# Patient Record
Sex: MALE | Race: WHITE | NOT HISPANIC OR LATINO | Employment: FULL TIME | URBAN - NONMETROPOLITAN AREA
[De-identification: names, ages, dates, MRNs, and addresses within clinical notes are randomized per-mention and may not be internally consistent; named-entity substitution may affect disease eponyms.]

---

## 2021-10-22 ENCOUNTER — APPOINTMENT (OUTPATIENT)
Dept: GENERAL RADIOLOGY | Facility: HOSPITAL | Age: 21
End: 2021-10-22

## 2021-10-22 ENCOUNTER — HOSPITAL ENCOUNTER (EMERGENCY)
Facility: HOSPITAL | Age: 21
Discharge: HOME | End: 2021-10-22
Attending: STUDENT IN AN ORGANIZED HEALTH CARE EDUCATION/TRAINING PROGRAM | Admitting: EMERGENCY MEDICINE

## 2021-10-22 VITALS
BODY MASS INDEX: 27.09 KG/M2 | HEART RATE: 79 BPM | SYSTOLIC BLOOD PRESSURE: 126 MMHG | DIASTOLIC BLOOD PRESSURE: 83 MMHG | WEIGHT: 200 LBS | HEIGHT: 72 IN | TEMPERATURE: 97.7 F | OXYGEN SATURATION: 99 % | RESPIRATION RATE: 18 BRPM

## 2021-10-22 DIAGNOSIS — S86.911A KNEE STRAIN, RIGHT, INITIAL ENCOUNTER: Primary | ICD-10-CM

## 2021-10-22 PROCEDURE — 73562 X-RAY EXAM OF KNEE 3: CPT

## 2021-10-22 PROCEDURE — 73562 X-RAY EXAM OF KNEE 3: CPT | Performed by: RADIOLOGY

## 2021-10-22 PROCEDURE — 99283 EMERGENCY DEPT VISIT LOW MDM: CPT

## 2021-10-22 RX ORDER — NAPROXEN 500 MG/1
500 TABLET ORAL 2 TIMES DAILY PRN
Qty: 12 TABLET | Refills: 0 | Status: SHIPPED | OUTPATIENT
Start: 2021-10-22 | End: 2022-11-23

## 2021-10-22 NOTE — ED PROVIDER NOTES
Subjective     History provided by:  Patient   used: No    Knee Pain  Location:  Knee  Time since incident:  1 day  Injury: no    Knee location:  R knee  Pain details:     Quality:  Aching    Radiates to:  Does not radiate    Severity:  Mild    Onset quality:  Sudden    Duration:  1 day    Timing:  Constant    Progression:  Worsening  Chronicity:  New  Dislocation: no    Relieved by:  Nothing  Worsened by:  Bearing weight  Ineffective treatments:  None tried  Associated symptoms: no fever        Review of Systems   Constitutional: Negative for chills and fever.   HENT: Negative for congestion, ear pain and sore throat.    Respiratory: Negative for cough, shortness of breath and wheezing.    Cardiovascular: Negative for chest pain.   Gastrointestinal: Negative for diarrhea, nausea and vomiting.   Genitourinary: Negative for dysuria and flank pain.   Musculoskeletal: Positive for joint swelling.   Skin: Negative for rash.   Neurological: Negative for headaches.   Psychiatric/Behavioral: The patient is not nervous/anxious.    All other systems reviewed and are negative.      No past medical history on file.    Allergies   Allergen Reactions   • Amoxicillin Rash       No past surgical history on file.    No family history on file.    Social History     Socioeconomic History   • Marital status: Single           Objective   Physical Exam  Vitals and nursing note reviewed.   Constitutional:       Appearance: He is well-developed.   HENT:      Head: Normocephalic.   Cardiovascular:      Rate and Rhythm: Normal rate and regular rhythm.   Pulmonary:      Effort: Pulmonary effort is normal.      Breath sounds: Normal breath sounds.   Abdominal:      General: Bowel sounds are normal.      Palpations: Abdomen is soft.      Tenderness: There is no abdominal tenderness.   Musculoskeletal:      Cervical back: Neck supple.      Right knee: Swelling present. Decreased range of motion.   Skin:     General: Skin is  warm and dry.   Neurological:      Mental Status: He is alert and oriented to person, place, and time.   Psychiatric:         Behavior: Behavior normal.         Thought Content: Thought content normal.         Judgment: Judgment normal.         Procedures           ED Course                                           MDM    Final diagnoses:   Knee strain, right, initial encounter       ED Disposition  ED Disposition     ED Disposition Condition Comment    Discharge Stable           Josue Godoy MD  [address] [place] [address] [phone]    In 2 days           Medication List      New Prescriptions    naproxen 500 MG EC tablet  Commonly known as: EC NAPROSYN  Take 1 tablet by mouth 2 (Two) Times a Day As Needed for Mild Pain .           Where to Get Your Medications      These medications were sent to [facility] [address] [place] [address] - [address] [place] [address] - [phone]  - [phone] [address] [address]    Phone: [phone]   · naproxen 500 MG EC tablet          Elodia Lisa PA  10/26/21 1016

## 2021-10-22 NOTE — ED NOTES
Pt being discharged ambulatory to car on crutches with knee immobilizer in place. Neurovascular status remains intact; NAD noted; respirations even and unlabored; alert and oriented X4; discharge papers and prescriptions given; patient verbalized understanding of follow up and discharge instructions; advised to return to [facility] with any issues         Dileep Alvarado, RN  10/22/21 0459

## 2021-10-22 NOTE — ED NOTES
Pt sitting up in chair. Results pending. updated on wait times; verbalized understanding. Skin pwd. denies any needs at this time. will continue to monitor. advised to notify RN of any change in condition.       Dileep Alvarado, RN  10/22/21 4486

## 2022-02-10 ENCOUNTER — OFFICE VISIT (OUTPATIENT)
Dept: PSYCHIATRY | Facility: CLINIC | Age: 22
End: 2022-02-10

## 2022-02-10 VITALS
WEIGHT: 230.8 LBS | HEIGHT: 72 IN | HEART RATE: 79 BPM | DIASTOLIC BLOOD PRESSURE: 86 MMHG | SYSTOLIC BLOOD PRESSURE: 126 MMHG | BODY MASS INDEX: 31.26 KG/M2

## 2022-02-10 DIAGNOSIS — R44.3 HALLUCINATIONS: ICD-10-CM

## 2022-02-10 DIAGNOSIS — F22: Primary | ICD-10-CM

## 2022-02-10 DIAGNOSIS — F84.0 HISTORY OF AUTISM SPECTRUM DISORDER: ICD-10-CM

## 2022-02-10 DIAGNOSIS — Z79.899 MEDICATION MANAGEMENT: ICD-10-CM

## 2022-02-10 LAB
AMPHETAMINE CUT-OFF: NORMAL
BENZODIAZIPINE CUT-OFF: NORMAL
BUPRENORPHINE CUT-OFF: NORMAL
COCAINE CUT-OFF: NORMAL
EXTERNAL AMPHETAMINE SCREEN URINE: NEGATIVE
EXTERNAL BENZODIAZEPINE SCREEN URINE: NEGATIVE
EXTERNAL BUPRENORPHINE SCREEN URINE: NEGATIVE
EXTERNAL COCAINE SCREEN URINE: NEGATIVE
EXTERNAL MDMA: NEGATIVE
EXTERNAL METHADONE SCREEN URINE: NEGATIVE
EXTERNAL METHAMPHETAMINE SCREEN URINE: NEGATIVE
EXTERNAL OPIATES SCREEN URINE: NEGATIVE
EXTERNAL OXYCODONE SCREEN URINE: NEGATIVE
EXTERNAL THC SCREEN URINE: NEGATIVE
MDMA CUT-OFF: NORMAL
METHADONE CUT-OFF: NORMAL
METHAMPHETAMINE CUT-OFF: NORMAL
OPIATES CUT-OFF: NORMAL
OXYCODONE CUT-OFF: NORMAL
THC CUT-OFF: NORMAL

## 2022-02-10 PROCEDURE — 90792 PSYCH DIAG EVAL W/MED SRVCS: CPT | Performed by: NURSE PRACTITIONER

## 2022-02-10 RX ORDER — PRAZOSIN HYDROCHLORIDE 1 MG/1
1 CAPSULE ORAL DAILY PRN
Qty: 30 CAPSULE | Refills: 1 | Status: SHIPPED | OUTPATIENT
Start: 2022-02-10 | End: 2022-03-10 | Stop reason: SDUPTHER

## 2022-02-10 RX ORDER — OLANZAPINE 7.5 MG/1
7.5 TABLET ORAL NIGHTLY
Qty: 30 TABLET | Refills: 1 | Status: SHIPPED | OUTPATIENT
Start: 2022-02-10 | End: 2022-03-10 | Stop reason: SDUPTHER

## 2022-02-10 RX ORDER — OLANZAPINE 7.5 MG/1
7.5 TABLET ORAL NIGHTLY
COMMUNITY
End: 2022-02-10 | Stop reason: HOSPADM

## 2022-03-10 DIAGNOSIS — Z86.59 HISTORY OF POSTTRAUMATIC STRESS DISORDER (PTSD): ICD-10-CM

## 2022-03-10 DIAGNOSIS — F32.1 CURRENT MODERATE EPISODE OF MAJOR DEPRESSIVE DISORDER, UNSPECIFIED WHETHER RECURRENT: ICD-10-CM

## 2022-03-10 RX ORDER — PRAZOSIN HYDROCHLORIDE 1 MG/1
1 CAPSULE ORAL DAILY PRN
Qty: 30 CAPSULE | Refills: 2 | Status: SHIPPED | OUTPATIENT
Start: 2022-03-10 | End: 2022-04-11 | Stop reason: SDUPTHER

## 2022-03-10 RX ORDER — OLANZAPINE 7.5 MG/1
7.5 TABLET ORAL NIGHTLY
Qty: 30 TABLET | Refills: 2 | Status: SHIPPED | OUTPATIENT
Start: 2022-03-10 | End: 2022-04-11 | Stop reason: SDUPTHER

## 2022-04-11 ENCOUNTER — OFFICE VISIT (OUTPATIENT)
Dept: PSYCHIATRY | Facility: CLINIC | Age: 22
End: 2022-04-11

## 2022-04-11 VITALS
DIASTOLIC BLOOD PRESSURE: 88 MMHG | WEIGHT: 239.8 LBS | HEART RATE: 81 BPM | BODY MASS INDEX: 32.52 KG/M2 | SYSTOLIC BLOOD PRESSURE: 126 MMHG

## 2022-04-11 DIAGNOSIS — F41.9 ANXIETY DISORDER, UNSPECIFIED TYPE: Primary | ICD-10-CM

## 2022-04-11 DIAGNOSIS — F22: ICD-10-CM

## 2022-04-11 DIAGNOSIS — Z86.59 HISTORY OF POSTTRAUMATIC STRESS DISORDER (PTSD): ICD-10-CM

## 2022-04-11 DIAGNOSIS — F32.1 CURRENT MODERATE EPISODE OF MAJOR DEPRESSIVE DISORDER, UNSPECIFIED WHETHER RECURRENT: ICD-10-CM

## 2022-04-11 DIAGNOSIS — F84.0 HISTORY OF AUTISM SPECTRUM DISORDER: ICD-10-CM

## 2022-04-11 DIAGNOSIS — Z79.899 MEDICATION MANAGEMENT: ICD-10-CM

## 2022-04-11 PROCEDURE — 99214 OFFICE O/P EST MOD 30 MIN: CPT | Performed by: NURSE PRACTITIONER

## 2022-04-11 RX ORDER — PROPRANOLOL HYDROCHLORIDE 20 MG/1
10-20 TABLET ORAL DAILY PRN
Qty: 30 TABLET | Refills: 0 | Status: SHIPPED | OUTPATIENT
Start: 2022-04-11 | End: 2022-06-01 | Stop reason: SDUPTHER

## 2022-04-11 RX ORDER — PRAZOSIN HYDROCHLORIDE 1 MG/1
1 CAPSULE ORAL DAILY PRN
Qty: 30 CAPSULE | Refills: 1 | Status: SHIPPED | OUTPATIENT
Start: 2022-04-11 | End: 2022-08-22 | Stop reason: SDUPTHER

## 2022-04-11 RX ORDER — OLANZAPINE 7.5 MG/1
7.5 TABLET ORAL NIGHTLY
Qty: 30 TABLET | Refills: 1 | Status: SHIPPED | OUTPATIENT
Start: 2022-04-11 | End: 2022-06-01 | Stop reason: SDUPTHER

## 2022-04-11 NOTE — PROGRESS NOTES
"Subjective   Srini Ruiz is a 21 y.o. male who is here with his fiance Suzan,  for  Med management follow up     Chief Complaint:  anxiety    HPI: Patient reports things are \"slowly getting better\". Suzan states his attitude is definitely better, because he isn't upset easily.   He denies feeling depressed. Denies AVH. Reports he is \"always anxious over everything.\" He is unable to provide specifics regarding anxiety. Suzan verbalizes concerns about traveling to [place] and patient getting anxious around crowds.     Patient states he  sleeps \"like I'm dead pretty much.\" Sleeps 10-12 hours every night. Denies nightmares. Reports \"appetite is \"through  the rough\" but he is trying to lose weight.  EMR reflects 9 pound weight gain since last visit.  Patient  reports he has a torn meniscus after falling 10 times on the same knee. Reports he sprained each ankle 18 times and they are weak, which causes  him to fall. Scheduled to see  ortho  this week.     The following portions of the patient's history were reviewed and updated as appropriate: allergies, current medications, past family history, past medical history, past social history, past surgical history and problem list.    Family History:  Family History   Problem Relation Age of Onset   • Schizophrenia Brother      Past Surgical History:  No past surgical history on file.    Problem List:  There is no problem list on file for this patient.    Allergy:  Allergies   Allergen Reactions   • Amoxicillin Rash     Current Medications:   Current Outpatient Medications   Medication Sig Dispense Refill   • OLANZapine (ZyPREXA) 7.5 MG tablet Take 1 tablet by mouth Every Night. 30 tablet 2   • prazosin (MINIPRESS) 1 MG capsule Take 1 capsule by mouth Daily As Needed (flashbacks). 30 capsule 2   • naproxen (EC NAPROSYN) 500 MG EC tablet Take 1 tablet by mouth 2 (Two) Times a Day As Needed for Mild Pain . 12 tablet 0     No current facility-administered medications for " this visit.     Review of Systems  Review of Systems   Constitutional: Positive for activity change and appetite change.   Gastrointestinal: Positive for diarrhea, nausea and vomiting.   Endocrine: Positive for polydipsia and polyphagia.        Hair loss   Musculoskeletal: Positive for arthralgias and gait problem.   Neurological: Positive for headache.   Psychiatric/Behavioral: Positive for decreased concentration, sleep disturbance and positive for hyperactivity. Negative for self-injury and suicidal ideas. The patient is nervous/anxious.    All other systems reviewed and are negative.    Objective   Physical Exam  Vitals reviewed.   Constitutional:       Appearance: He is obese.   Neurological:      Mental Status: He is alert and oriented to person, place, and time.      Gait: Gait is intact.   Psychiatric:         Mood and Affect: Mood and affect normal.         Speech: Speech is tangential.         Behavior: Behavior is cooperative.         Thought Content: Thought content is delusional.         Judgment: Judgment is inappropriate.       Blood pressure 126/88, pulse 81, weight 109 kg (239 lb 12.8 oz).    Appearance: Srini is a friendly 21-year-old  male.  He appears  Fairly clean, wearing blue shirt with flag, and jeans  Balding. Lip pierced.      Gait, Station, Strength: Posture upright, gait steady. No signs of impairment or acute distress. No abnormal muscle movements, motor tics or tremors observed.     Mental Status Exam:   Hygiene:   fair  Cooperation:  Cooperative,   Eye Contact:  Good  Psychomotor Behavior:  Appropriate  Affect:  Appropriate  Hopelessness: Denies  Optimistic:Yes  Speech:  Normal  Thought Process:  Goal directed    Thought Content:  Normal  Suicidal:  None  Homicidal:  None  Hallucinations:  Not demonstrated today-none in 1 year per pt  Delusion:  Unable to demonstrate;    Memory:  Intact  Orientation:  Person, Place, Time and Situation  Reliability:  questionable; Need  collateral source  Insight:  Fair- improving  Judgement:  Fair  Impulse Control:  Fair  Physical/Medical Issues:  No     Assessment/Plan   Diagnoses and all orders for this visit:    1. Anxiety disorder, unspecified type (Primary)  -     OLANZapine (ZyPREXA) 7.5 MG tablet; Take 1 tablet by mouth Every Night.  Dispense: 30 tablet; Refill: 1  -     prazosin (MINIPRESS) 1 MG capsule; Take 1 capsule by mouth Daily As Needed (flashbacks).  Dispense: 30 capsule; Refill: 1  -     propranolol (INDERAL) 20 MG tablet; Take 0.5-1 tablets by mouth Daily As Needed (anxiety). Avoid using within 6 hrs of prazosin  Dispense: 30 tablet; Refill: 0    2. Current moderate episode of major depressive disorder, unspecified whether recurrent (HCC)-improved  -     OLANZapine (ZyPREXA) 7.5 MG tablet; Take 1 tablet by mouth Every Night.  Dispense: 30 tablet; Refill: 1    3. Delusional disorder, grandiose type (HCC)  -     OLANZapine (ZyPREXA) 7.5 MG tablet; Take 1 tablet by mouth Every Night.  Dispense: 30 tablet; Refill: 1    4. History of autism spectrum disorder  -     OLANZapine (ZyPREXA) 7.5 MG tablet; Take 1 tablet by mouth Every Night.  Dispense: 30 tablet; Refill: 1    5. History of posttraumatic stress disorder (PTSD)  -     OLANZapine (ZyPREXA) 7.5 MG tablet; Take 1 tablet by mouth Every Night.  Dispense: 30 tablet; Refill: 1  -     prazosin (MINIPRESS) 1 MG capsule; Take 1 capsule by mouth Daily As Needed (flashbacks).  Dispense: 30 capsule; Refill: 1    6. Medication management    Patient states he takes medication daily as prescribed and denies medication side effects other than increased appetite.  Patient denies feeling depressed. Denies AVH.  He appears more focused, less tangential and does  not demonstrate delusional thoughts today.  Reports he  constantly feels anxious.  Patient reports he previously tried hydroxyzine without benefit.  Fiance  is  Worried their flight to [place] might increase his anxiety.  Potential  benefits, risk, side effects of using propanolol as needed for anxiety discussed.  Patient is aware this is a antihypertensive medication and should be avoided with him 6 hours use of prazosin.  Agrees to monitor and report intolerable side effects.  Encouraged him to consume nutrient dense diet to mitigate weight gain, especially due to activity restrictions. Offered to lower Zyprexa dose due to concerns of weight gain. Patient declines.     -Continue Zyprexa 7.5 mg at night for hallucinations, delusions     -Continue prazosin  1 mg at night as needed    -Start propanolol 10-20 mg daily as needed for anxiety    -Discussed utility in monitoring labs for baseline comparison. Importance of correcting vitamin and metabolic alterations to optimize psychiatric treatment. Labs will need to be monitored regularly while taking SGA.     - Monitor wt. 9 lb wt gain since last visit    -Labs ordered     - psychotherapy encouraged    -- Desires consult with bandar to establish care    -The benefits of a healthy diet and exercise were discussed with patient, especially the positive effects they have on mental health. Patient encouraged to consider lifestyle modification regarding  diet and exercise patterns to maximize results of mental health treatment.     -Reports reviewed include: Td report, previous available documentation and labs    -Srini Ruiz  reports that he has been smoking. He has never used smokeless tobacco.. I have educated him on the risk of diseases from using tobacco products such as cancer, COPD and heart disease.     Visit Diagnoses:    ICD-10-CM ICD-9-CM   1. Anxiety disorder, unspecified type  F41.9 300.00   2. Current moderate episode of major depressive disorder, unspecified whether recurrent (HCC)-improved  F32.1 296.22   3. Delusional disorder, grandiose type (HCC)  F22 297.1   4. History of autism spectrum disorder  F84.0 299.00   5. History of posttraumatic stress disorder (PTSD)  Z86.59 V11.8    6. Medication management  Z79.899 V58.69     TREATMENT PLAN/GOALS:  Treatment and medication options discussed during today's visit.  Opportunity provided for any necessary clarification and patient questions.  Patient acknowledges and verbally consents to proceed with mutually agreed upon treatment plan. Patient reminded of important role medication adherence and regular follow-up appointments play in symptom alleviation and long term prognosis. Encouraged to Pursue  supportive psychotherapy and take medications as prescribed.    MEDICATION ISSUES: Discussed medication treatment options and plan of prescribed medication. Potential risks, benefits, and side effects including but not limited to the following reviewed: Black Box warnings, worsening symptoms, SI, sedation, GI side effects, metabolic alterations and blood pressure fluctuations. Patient is reminded to refrain from illicit substance use, including alcohol and THC while taking medications. Also advised to refrain from activity requiring  alertness until sedative effects of medication are assessed. Patient agrees to call [facility] with any worsening of symptoms or onset of intolerable side effects. Patient is agreeable to call 911 or go to the nearest ER should he  begin having SI/HI.     PROGNOSIS  Prognosis: Guarded- dependent on patient's adherence to medication treatment plan and follow up appointments  Functionality: Patient not yet showing improvements in important areas of daily functioning.     Short-term goals: Patient will adhere to medication regimen and experience continued improvement in symptoms over the next 3 months.   Long-term goals: Patient will adhere to medication treatment plan and report improvement in symptoms over the next 6 months    MEDS ORDERED DURING VISIT:  Zyprexa 7.5 mg # 30, 1 refill  Prazosin 1 mg # 30, 1 refill  Propanolol 20 mg # 30, 0 refills    Return in about 2 months (around 6/11/2022).          Latoya ABRAMS  CONNIE Menezes    This document has been electronically signed by CONNIE Cowart   April 11, 2022 14:21 EDT    Part of this note may be an electronic transcription/translation of spoken language to printed text using the Dragon Dictation System.

## 2022-04-14 ENCOUNTER — TRANSCRIBE ORDERS (OUTPATIENT)
Dept: ADMINISTRATIVE | Facility: HOSPITAL | Age: 22
End: 2022-04-14

## 2022-04-14 DIAGNOSIS — Z01.818 PRE-OPERATIVE CLEARANCE: Primary | ICD-10-CM

## 2022-05-13 ENCOUNTER — LAB (OUTPATIENT)
Dept: LAB | Facility: HOSPITAL | Age: 22
End: 2022-05-13

## 2022-05-13 DIAGNOSIS — Z01.818 PRE-OPERATIVE CLEARANCE: ICD-10-CM

## 2022-05-16 ENCOUNTER — APPOINTMENT (OUTPATIENT)
Dept: GENERAL RADIOLOGY | Facility: HOSPITAL | Age: 22
End: 2022-05-16

## 2022-05-16 ENCOUNTER — ANESTHESIA EVENT (OUTPATIENT)
Dept: PERIOP | Facility: HOSPITAL | Age: 22
End: 2022-05-16

## 2022-05-16 ENCOUNTER — HOSPITAL ENCOUNTER (OUTPATIENT)
Facility: HOSPITAL | Age: 22
Setting detail: HOSPITAL OUTPATIENT SURGERY
Discharge: HOME | End: 2022-05-16
Attending: GENERAL PRACTICE | Admitting: GENERAL PRACTICE

## 2022-05-16 ENCOUNTER — ANESTHESIA (OUTPATIENT)
Dept: PERIOP | Facility: HOSPITAL | Age: 22
End: 2022-05-16

## 2022-05-16 VITALS
TEMPERATURE: 97.4 F | BODY MASS INDEX: 32.51 KG/M2 | WEIGHT: 240 LBS | OXYGEN SATURATION: 98 % | HEART RATE: 68 BPM | HEIGHT: 72 IN | SYSTOLIC BLOOD PRESSURE: 123 MMHG | DIASTOLIC BLOOD PRESSURE: 86 MMHG | RESPIRATION RATE: 18 BRPM

## 2022-05-16 DIAGNOSIS — Z98.890 H/O ARTHROSCOPIC KNEE SURGERY: Primary | ICD-10-CM

## 2022-05-16 LAB
FLUAV SUBTYP SPEC NAA+PROBE: NOT DETECTED
FLUBV RNA ISLT QL NAA+PROBE: NOT DETECTED
SARS-COV-2 RNA PNL SPEC NAA+PROBE: NOT DETECTED

## 2022-05-16 PROCEDURE — C9803 HOPD COVID-19 SPEC COLLECT: HCPCS

## 2022-05-16 PROCEDURE — 87636 SARSCOV2 & INF A&B AMP PRB: CPT | Performed by: GENERAL PRACTICE

## 2022-05-16 PROCEDURE — 25010000002 FENTANYL CITRATE (PF) 50 MCG/ML SOLUTION: Performed by: NURSE ANESTHETIST, CERTIFIED REGISTERED

## 2022-05-16 PROCEDURE — 25010000002 KETOROLAC TROMETHAMINE PER 15 MG: Performed by: NURSE ANESTHETIST, CERTIFIED REGISTERED

## 2022-05-16 PROCEDURE — 25010000002 PROPOFOL 10 MG/ML EMULSION: Performed by: NURSE ANESTHETIST, CERTIFIED REGISTERED

## 2022-05-16 PROCEDURE — 25010000002 ONDANSETRON PER 1 MG: Performed by: NURSE ANESTHETIST, CERTIFIED REGISTERED

## 2022-05-16 RX ORDER — BUPIVACAINE HYDROCHLORIDE 2.5 MG/ML
INJECTION, SOLUTION EPIDURAL; INFILTRATION; INTRACAUDAL AS NEEDED
Status: DISCONTINUED | OUTPATIENT
Start: 2022-05-16 | End: 2022-05-16 | Stop reason: HOSPADM

## 2022-05-16 RX ORDER — OXYCODONE HYDROCHLORIDE AND ACETAMINOPHEN 5; 325 MG/1; MG/1
1 TABLET ORAL ONCE AS NEEDED
Status: COMPLETED | OUTPATIENT
Start: 2022-05-16 | End: 2022-05-16

## 2022-05-16 RX ORDER — SODIUM CHLORIDE 0.9 % (FLUSH) 0.9 %
10 SYRINGE (ML) INJECTION AS NEEDED
Status: DISCONTINUED | OUTPATIENT
Start: 2022-05-16 | End: 2022-05-16 | Stop reason: HOSPADM

## 2022-05-16 RX ORDER — ONDANSETRON 4 MG/1
4 TABLET, FILM COATED ORAL EVERY 8 HOURS PRN
Qty: 20 TABLET | Refills: 0 | Status: SHIPPED | OUTPATIENT
Start: 2022-05-16

## 2022-05-16 RX ORDER — SODIUM CHLORIDE, SODIUM LACTATE, POTASSIUM CHLORIDE, CALCIUM CHLORIDE 600; 310; 30; 20 MG/100ML; MG/100ML; MG/100ML; MG/100ML
100 INJECTION, SOLUTION INTRAVENOUS ONCE AS NEEDED
Status: DISCONTINUED | OUTPATIENT
Start: 2022-05-16 | End: 2022-05-16 | Stop reason: HOSPADM

## 2022-05-16 RX ORDER — ONDANSETRON 2 MG/ML
INJECTION INTRAMUSCULAR; INTRAVENOUS AS NEEDED
Status: DISCONTINUED | OUTPATIENT
Start: 2022-05-16 | End: 2022-05-16 | Stop reason: SURG

## 2022-05-16 RX ORDER — CELECOXIB 200 MG/1
200 CAPSULE ORAL DAILY
Qty: 14 CAPSULE | Refills: 0 | Status: SHIPPED | OUTPATIENT
Start: 2022-05-16 | End: 2022-11-23

## 2022-05-16 RX ORDER — KETOROLAC TROMETHAMINE 30 MG/ML
30 INJECTION, SOLUTION INTRAMUSCULAR; INTRAVENOUS EVERY 6 HOURS PRN
Status: COMPLETED | OUTPATIENT
Start: 2022-05-16 | End: 2022-05-16

## 2022-05-16 RX ORDER — OXYCODONE HYDROCHLORIDE 5 MG/1
5 TABLET ORAL EVERY 6 HOURS PRN
Qty: 30 TABLET | Refills: 0 | Status: SHIPPED | OUTPATIENT
Start: 2022-05-16 | End: 2023-02-23

## 2022-05-16 RX ORDER — FENTANYL CITRATE 50 UG/ML
50 INJECTION, SOLUTION INTRAMUSCULAR; INTRAVENOUS
Status: DISCONTINUED | OUTPATIENT
Start: 2022-05-16 | End: 2022-05-16 | Stop reason: HOSPADM

## 2022-05-16 RX ORDER — MIDAZOLAM HYDROCHLORIDE 1 MG/ML
1 INJECTION INTRAMUSCULAR; INTRAVENOUS
Status: DISCONTINUED | OUTPATIENT
Start: 2022-05-16 | End: 2022-05-16 | Stop reason: HOSPADM

## 2022-05-16 RX ORDER — FENTANYL CITRATE 50 UG/ML
INJECTION, SOLUTION INTRAMUSCULAR; INTRAVENOUS AS NEEDED
Status: DISCONTINUED | OUTPATIENT
Start: 2022-05-16 | End: 2022-05-16 | Stop reason: SURG

## 2022-05-16 RX ORDER — SODIUM CHLORIDE 0.9 % (FLUSH) 0.9 %
10 SYRINGE (ML) INJECTION EVERY 12 HOURS SCHEDULED
Status: DISCONTINUED | OUTPATIENT
Start: 2022-05-16 | End: 2022-05-16 | Stop reason: HOSPADM

## 2022-05-16 RX ORDER — IPRATROPIUM BROMIDE AND ALBUTEROL SULFATE 2.5; .5 MG/3ML; MG/3ML
3 SOLUTION RESPIRATORY (INHALATION) ONCE AS NEEDED
Status: DISCONTINUED | OUTPATIENT
Start: 2022-05-16 | End: 2022-05-16 | Stop reason: HOSPADM

## 2022-05-16 RX ORDER — MEPERIDINE HYDROCHLORIDE 25 MG/ML
12.5 INJECTION INTRAMUSCULAR; INTRAVENOUS; SUBCUTANEOUS
Status: DISCONTINUED | OUTPATIENT
Start: 2022-05-16 | End: 2022-05-16 | Stop reason: HOSPADM

## 2022-05-16 RX ORDER — SODIUM CHLORIDE, SODIUM LACTATE, POTASSIUM CHLORIDE, CALCIUM CHLORIDE 600; 310; 30; 20 MG/100ML; MG/100ML; MG/100ML; MG/100ML
125 INJECTION, SOLUTION INTRAVENOUS ONCE
Status: COMPLETED | OUTPATIENT
Start: 2022-05-16 | End: 2022-05-16

## 2022-05-16 RX ORDER — FAMOTIDINE 10 MG/ML
INJECTION, SOLUTION INTRAVENOUS AS NEEDED
Status: DISCONTINUED | OUTPATIENT
Start: 2022-05-16 | End: 2022-05-16 | Stop reason: SURG

## 2022-05-16 RX ORDER — MAGNESIUM HYDROXIDE 1200 MG/15ML
LIQUID ORAL AS NEEDED
Status: DISCONTINUED | OUTPATIENT
Start: 2022-05-16 | End: 2022-05-16 | Stop reason: HOSPADM

## 2022-05-16 RX ORDER — CLINDAMYCIN PHOSPHATE 900 MG/50ML
900 INJECTION INTRAVENOUS
Status: COMPLETED | OUTPATIENT
Start: 2022-05-17 | End: 2022-05-16

## 2022-05-16 RX ORDER — ONDANSETRON 2 MG/ML
4 INJECTION INTRAMUSCULAR; INTRAVENOUS AS NEEDED
Status: DISCONTINUED | OUTPATIENT
Start: 2022-05-16 | End: 2022-05-16 | Stop reason: HOSPADM

## 2022-05-16 RX ORDER — PROPOFOL 10 MG/ML
VIAL (ML) INTRAVENOUS AS NEEDED
Status: DISCONTINUED | OUTPATIENT
Start: 2022-05-16 | End: 2022-05-16 | Stop reason: SURG

## 2022-05-16 RX ORDER — LIDOCAINE HYDROCHLORIDE 10 MG/ML
INJECTION, SOLUTION EPIDURAL; INFILTRATION; INTRACAUDAL; PERINEURAL AS NEEDED
Status: DISCONTINUED | OUTPATIENT
Start: 2022-05-16 | End: 2022-05-16 | Stop reason: HOSPADM

## 2022-05-16 RX ADMIN — OXYCODONE HYDROCHLORIDE AND ACETAMINOPHEN 1 TABLET: 5; 325 TABLET ORAL at 13:50

## 2022-05-16 RX ADMIN — SODIUM CHLORIDE, POTASSIUM CHLORIDE, SODIUM LACTATE AND CALCIUM CHLORIDE: 600; 310; 30; 20 INJECTION, SOLUTION INTRAVENOUS at 12:57

## 2022-05-16 RX ADMIN — PROPOFOL 100 MG: 10 INJECTION, EMULSION INTRAVENOUS at 12:36

## 2022-05-16 RX ADMIN — SODIUM CHLORIDE, POTASSIUM CHLORIDE, SODIUM LACTATE AND CALCIUM CHLORIDE 125 ML/HR: 600; 310; 30; 20 INJECTION, SOLUTION INTRAVENOUS at 11:07

## 2022-05-16 RX ADMIN — FENTANYL CITRATE 50 MCG: 50 INJECTION INTRAMUSCULAR; INTRAVENOUS at 12:42

## 2022-05-16 RX ADMIN — CLINDAMYCIN PHOSPHATE 900 MG: 900 INJECTION, SOLUTION INTRAVENOUS at 12:30

## 2022-05-16 RX ADMIN — SODIUM CHLORIDE, POTASSIUM CHLORIDE, SODIUM LACTATE AND CALCIUM CHLORIDE: 600; 310; 30; 20 INJECTION, SOLUTION INTRAVENOUS at 12:30

## 2022-05-16 RX ADMIN — FAMOTIDINE 20 MG: 10 INJECTION INTRAVENOUS at 12:30

## 2022-05-16 RX ADMIN — ONDANSETRON 4 MG: 2 INJECTION INTRAMUSCULAR; INTRAVENOUS at 12:30

## 2022-05-16 RX ADMIN — KETOROLAC TROMETHAMINE 30 MG: 30 INJECTION, SOLUTION INTRAMUSCULAR; INTRAVENOUS at 13:52

## 2022-05-16 RX ADMIN — FENTANYL CITRATE 50 MCG: 50 INJECTION INTRAMUSCULAR; INTRAVENOUS at 12:52

## 2022-05-16 NOTE — ANESTHESIA PROCEDURE NOTES
Airway  Urgency: elective    Date/Time: 5/16/2022 12:36 PM  Airway not difficult    General Information and Staff    Patient location during procedure: OR  Anesthesiologist: Uriel Espinal MD  CRNA/CAA: Mike Paris CRNA    Indications and Patient Condition    Preoxygenated: yes  MILS not maintained throughout  Mask difficulty assessment: 0 - not attempted    Final Airway Details  Final airway type: supraglottic airway      Successful airway: unique  Size 4    Number of attempts at approach: 1  Assessment: lips, teeth, and gum same as pre-op and atraumatic intubation    Additional Comments  Atraumatic LMA placement, dentition unchanged.

## 2022-05-16 NOTE — ANESTHESIA PREPROCEDURE EVALUATION
Anesthesia Evaluation     no history of anesthetic complications:  NPO Solid Status: > 8 hours  NPO Liquid Status: > 8 hours           Airway   Mallampati: II  TM distance: >3 FB  Neck ROM: full  No difficulty expected  Dental - normal exam     Pulmonary - normal exam   (+) a smoker Current,   Cardiovascular - normal exam        Neuro/Psych  (+) psychiatric history PTSD,    GI/Hepatic/Renal/Endo      Musculoskeletal     Abdominal  - normal exam    Bowel sounds: normal.   Substance History      OB/GYN          Other            Phys Exam Other: Lip piercings  Lower lip   Refuses to remove   Will tape               Anesthesia Plan    ASA 2     general     intravenous induction     Anesthetic plan, all risks, benefits, and alternatives have been provided, discussed and informed consent has been obtained with: patient.        CODE STATUS:

## 2022-05-16 NOTE — BRIEF OP NOTE
KNEE ARTHROSCOPY WITH MENISCECTOMY  Progress Note    Srini Ruiz  5/16/2022    Pre-op Diagnosis:   S83.241A       Post-Op Diagnosis Codes:  Right knee medial pain  no meniscal tear  No ACL/PCL tear  No loose body  No chondral injury  No plica    Procedure/CPT® Codes:  1.  Right knee diagnostic arthroscopy, CPT code 90464      Procedure(s):  KNEE DIAGNOSTIC ARTHROSCOPY    Surgeon(s):  Lopez Dubose MD    Anesthesia: General    Staff:   Circulator: Martha Sheppard RN; Dany Osborne RN  Scrub Person: Saloni Fierro  Assistant: Robert Colbert  Assistant: Robert Colbert      Estimated Blood Loss: none    Urine Voided: * No values recorded between 5/16/2022 12:32 PM and 5/16/2022  1:04 PM *    Specimens:                None          Drains: * No LDAs found *    Findings: See operative note        Complications: None apparent    Assistant: Robert Colbert  was responsible for performing the following activities: Retraction, Suction, Irrigation, Suturing, Closing and Placing Dressing and their skilled assistance was necessary for the success of this case.    Lopez Dubose MD     Date: 5/16/2022  Time: 13:07 EDT

## 2022-05-16 NOTE — ANESTHESIA POSTPROCEDURE EVALUATION
Patient: Srini Ruiz    Procedure Summary     Date: 05/16/22 Room / Location: [facility] OR 03 /  [facility] OR    Anesthesia Start: 1230 Anesthesia Stop: 1310    Procedure: KNEE DIAGNOSTIC ARTHROSCOPY (Right Knee) Diagnosis: (S83.241A)    Surgeons: Lopez Dubose MD Provider: Uriel Espinal MD    Anesthesia Type: general ASA Status: 2          Anesthesia Type: general    Vitals  Vitals Value Taken Time   /72 05/16/22 1341   Temp 98.3 °F (36.8 °C) 05/16/22 1311   Pulse 72 05/16/22 1341   Resp 14 05/16/22 1341   SpO2 98 % 05/16/22 1341           Post Anesthesia Care and Evaluation    Patient location during evaluation: bedside  Patient participation: complete - patient participated  Level of consciousness: awake and alert  Pain score: 1  Pain management: adequate  Airway patency: patent  Anesthetic complications: No anesthetic complications  PONV Status: none  Cardiovascular status: acceptable  Respiratory status: acceptable  Hydration status: acceptable

## 2022-05-16 NOTE — OP NOTE
Srini Ruiz  5/16/2022      Operative Note:    Surgeon: SHANNAN Dubose MD    Assistant: DEANDRE Colbert    Pre-Operative Diagnosis:   Right knee medial joint line pain    Post-Operative Diagnosis:   Right knee medial joint line pain  No meniscal tear  No ACL/PCL tear  No loose body or chondral injury  No plica  No knee joint synovitis noted    Procedure(s):    1.  Right knee diagnostic arthroscopy, CPT code 19668    Anesthesia: General with local field block    Estimated Blood Loss: 0 cc    Specimen Obtained:  None    Complication(s):  None apparent.     Implants: None    Operative Indication:     Srini Coombs is a 21-year-old with right medial joint line pain.  He has tried therapy as well as home exercises for his pain.  He has tried over-the-counter medicine and topical creams.  He has also tried a corticosteroid injection which relieved his pain for 1 day.  He had an MRI consistent with probable posterior medial meniscus tear.  Once here the risk benefits alternatives and complications he elected to proceed with surgery.    Arthroscopic Findings:    There is no synovitis that was noted.  There is no loose body or chondral injury.  There is a small effusion.  With probing of the medial and lateral menisci there was no tear identified.  There is no tear of the ACL or PCL.  There was no plica on the medial or lateral aspect of his knee.    Operative Details:    The patient was met in the pre operative suite where the correct operative site was marked. The patient was brought to the operating room where anesthesia was initiated. The patient was positioned appropriately with all bony prominences well padded. The patient was prepped and draped in the usual sterile fashion and prior to incision a timeout was observed to verify the correct operative site, procedure and antibiotics.    Anterior lateral portal was created and an arthroscope was introduced into the knee joint.  A thorough diagnostic arthroscopy was performed  with the findings noted above.    A medial portal was created and a shaver was utilized to evacuate the effusion and debride a small amount of the anterior fat pad for better visualization.    No intra-articular pathology was found.  No debridement or meniscal repair was performed on the posterior medial meniscus.    All portal sites were closed once excess water was then manually expressed from the portal sites.  A soft dressing was applied.  A local field block was administered.  The patient was extubated, transferred to hospital bed in the PACU stable condition.  The patient tolerated procedure well without a complication.    Post-operative Plan:    Discharge to home today  Dressing-May remove dressing in 3 to 4 days  Weight Bear/Lifting Status-as tolerated  DVT PPx-aspirin 81 mg twice daily for 14 days  Follow up-2 weeks in the office    Electronically Signed by: Lopez Dubose MD

## 2022-06-01 ENCOUNTER — LAB (OUTPATIENT)
Dept: LAB | Facility: HOSPITAL | Age: 22
End: 2022-06-01

## 2022-06-01 ENCOUNTER — OFFICE VISIT (OUTPATIENT)
Dept: PSYCHIATRY | Facility: CLINIC | Age: 22
End: 2022-06-01

## 2022-06-01 VITALS
HEIGHT: 72 IN | BODY MASS INDEX: 32.75 KG/M2 | WEIGHT: 241.8 LBS | DIASTOLIC BLOOD PRESSURE: 86 MMHG | HEART RATE: 80 BPM | SYSTOLIC BLOOD PRESSURE: 131 MMHG

## 2022-06-01 DIAGNOSIS — F22: ICD-10-CM

## 2022-06-01 DIAGNOSIS — F41.9 ANXIETY DISORDER, UNSPECIFIED TYPE: Primary | ICD-10-CM

## 2022-06-01 DIAGNOSIS — Z86.59 HISTORY OF POSTTRAUMATIC STRESS DISORDER (PTSD): ICD-10-CM

## 2022-06-01 DIAGNOSIS — Z79.899 MEDICATION MANAGEMENT: ICD-10-CM

## 2022-06-01 DIAGNOSIS — F84.0 HISTORY OF AUTISM SPECTRUM DISORDER: ICD-10-CM

## 2022-06-01 DIAGNOSIS — F41.9 ANXIETY DISORDER, UNSPECIFIED TYPE: ICD-10-CM

## 2022-06-01 DIAGNOSIS — F32.1 CURRENT MODERATE EPISODE OF MAJOR DEPRESSIVE DISORDER, UNSPECIFIED WHETHER RECURRENT: ICD-10-CM

## 2022-06-01 PROCEDURE — 36415 COLL VENOUS BLD VENIPUNCTURE: CPT | Performed by: NURSE PRACTITIONER

## 2022-06-01 PROCEDURE — 84146 ASSAY OF PROLACTIN: CPT

## 2022-06-01 PROCEDURE — 90792 PSYCH DIAG EVAL W/MED SRVCS: CPT | Performed by: NURSE PRACTITIONER

## 2022-06-01 PROCEDURE — 82306 VITAMIN D 25 HYDROXY: CPT | Performed by: NURSE PRACTITIONER

## 2022-06-01 PROCEDURE — 84439 ASSAY OF FREE THYROXINE: CPT | Performed by: NURSE PRACTITIONER

## 2022-06-01 PROCEDURE — 80053 COMPREHEN METABOLIC PANEL: CPT | Performed by: NURSE PRACTITIONER

## 2022-06-01 PROCEDURE — 82607 VITAMIN B-12: CPT | Performed by: NURSE PRACTITIONER

## 2022-06-01 PROCEDURE — 85025 COMPLETE CBC W/AUTO DIFF WBC: CPT | Performed by: NURSE PRACTITIONER

## 2022-06-01 PROCEDURE — 84443 ASSAY THYROID STIM HORMONE: CPT | Performed by: NURSE PRACTITIONER

## 2022-06-01 PROCEDURE — 80061 LIPID PANEL: CPT | Performed by: NURSE PRACTITIONER

## 2022-06-01 RX ORDER — PROPRANOLOL HYDROCHLORIDE 20 MG/1
10-20 TABLET ORAL DAILY PRN
Qty: 30 TABLET | Refills: 1 | Status: SHIPPED | OUTPATIENT
Start: 2022-06-01 | End: 2022-08-22 | Stop reason: SDUPTHER

## 2022-06-01 RX ORDER — CETIRIZINE HYDROCHLORIDE 10 MG/1
TABLET ORAL
COMMUNITY
Start: 2022-05-20 | End: 2022-11-23

## 2022-06-01 RX ORDER — OLANZAPINE 7.5 MG/1
7.5 TABLET ORAL NIGHTLY
Qty: 30 TABLET | Refills: 1 | Status: SHIPPED | OUTPATIENT
Start: 2022-06-01 | End: 2022-08-22 | Stop reason: SDUPTHER

## 2022-06-01 RX ORDER — CEPHALEXIN 500 MG/1
CAPSULE ORAL
COMMUNITY
Start: 2022-05-20 | End: 2022-11-23

## 2022-06-02 LAB
25(OH)D3 SERPL-MCNC: 24.2 NG/ML (ref 30–100)
ALBUMIN SERPL-MCNC: 4.5 G/DL (ref 3.5–5.2)
ALBUMIN/GLOB SERPL: 2 G/DL
ALP SERPL-CCNC: 100 U/L (ref 39–117)
ALT SERPL W P-5'-P-CCNC: 57 U/L (ref 1–41)
ANION GAP SERPL CALCULATED.3IONS-SCNC: 11.6 MMOL/L (ref 5–15)
AST SERPL-CCNC: 28 U/L (ref 1–40)
BASOPHILS # BLD AUTO: 0.05 10*3/MM3 (ref 0–0.2)
BASOPHILS NFR BLD AUTO: 0.7 % (ref 0–1.5)
BILIRUB SERPL-MCNC: 0.2 MG/DL (ref 0–1.2)
BUN SERPL-MCNC: 8 MG/DL (ref 6–20)
BUN/CREAT SERPL: 11.3 (ref 7–25)
CALCIUM SPEC-SCNC: 8.8 MG/DL (ref 8.6–10.5)
CHLORIDE SERPL-SCNC: 109 MMOL/L (ref 98–107)
CHOLEST SERPL-MCNC: 129 MG/DL (ref 0–200)
CO2 SERPL-SCNC: 19.4 MMOL/L (ref 22–29)
CREAT SERPL-MCNC: 0.71 MG/DL (ref 0.76–1.27)
DEPRECATED RDW RBC AUTO: 38.6 FL (ref 37–54)
EGFRCR SERPLBLD CKD-EPI 2021: 132.2 ML/MIN/1.73
EOSINOPHIL # BLD AUTO: 0.11 10*3/MM3 (ref 0–0.4)
EOSINOPHIL NFR BLD AUTO: 1.5 % (ref 0.3–6.2)
ERYTHROCYTE [DISTWIDTH] IN BLOOD BY AUTOMATED COUNT: 12.8 % (ref 12.3–15.4)
GLOBULIN UR ELPH-MCNC: 2.3 GM/DL
GLUCOSE SERPL-MCNC: 95 MG/DL (ref 65–99)
HCT VFR BLD AUTO: 43.6 % (ref 37.5–51)
HDLC SERPL-MCNC: 26 MG/DL (ref 40–60)
HGB BLD-MCNC: 14.8 G/DL (ref 13–17.7)
IMM GRANULOCYTES # BLD AUTO: 0.02 10*3/MM3 (ref 0–0.05)
IMM GRANULOCYTES NFR BLD AUTO: 0.3 % (ref 0–0.5)
LDLC SERPL CALC-MCNC: 55 MG/DL (ref 0–100)
LDLC/HDLC SERPL: 1.58 {RATIO}
LYMPHOCYTES # BLD AUTO: 2.77 10*3/MM3 (ref 0.7–3.1)
LYMPHOCYTES NFR BLD AUTO: 39 % (ref 19.6–45.3)
MCH RBC QN AUTO: 28.5 PG (ref 26.6–33)
MCHC RBC AUTO-ENTMCNC: 33.9 G/DL (ref 31.5–35.7)
MCV RBC AUTO: 84 FL (ref 79–97)
MONOCYTES # BLD AUTO: 0.63 10*3/MM3 (ref 0.1–0.9)
MONOCYTES NFR BLD AUTO: 8.9 % (ref 5–12)
NEUTROPHILS NFR BLD AUTO: 3.53 10*3/MM3 (ref 1.7–7)
NEUTROPHILS NFR BLD AUTO: 49.6 % (ref 42.7–76)
NRBC BLD AUTO-RTO: 0 /100 WBC (ref 0–0.2)
PLATELET # BLD AUTO: 315 10*3/MM3 (ref 140–450)
PMV BLD AUTO: 10.2 FL (ref 6–12)
POTASSIUM SERPL-SCNC: 4.1 MMOL/L (ref 3.5–5.2)
PROLACTIN SERPL-MCNC: 6.35 NG/ML (ref 4.04–15.2)
PROT SERPL-MCNC: 6.8 G/DL (ref 6–8.5)
RBC # BLD AUTO: 5.19 10*6/MM3 (ref 4.14–5.8)
SODIUM SERPL-SCNC: 140 MMOL/L (ref 136–145)
T4 FREE SERPL-MCNC: 1.41 NG/DL (ref 0.93–1.7)
TRIGL SERPL-MCNC: 309 MG/DL (ref 0–150)
TSH SERPL DL<=0.05 MIU/L-ACNC: 2.02 UIU/ML (ref 0.27–4.2)
VIT B12 BLD-MCNC: 692 PG/ML (ref 211–946)
VLDLC SERPL-MCNC: 48 MG/DL (ref 5–40)
WBC NRBC COR # BLD: 7.11 10*3/MM3 (ref 3.4–10.8)

## 2022-06-15 ENCOUNTER — OFFICE VISIT (OUTPATIENT)
Dept: FAMILY MEDICINE CLINIC | Age: 22
End: 2022-06-15

## 2022-06-15 DIAGNOSIS — E78.1 HIGH TRIGLYCERIDES: ICD-10-CM

## 2022-06-15 DIAGNOSIS — E55.9 VITAMIN D INSUFFICIENCY: ICD-10-CM

## 2022-06-15 DIAGNOSIS — Z76.89 ENCOUNTER TO ESTABLISH CARE: Primary | ICD-10-CM

## 2022-06-15 PROCEDURE — 99441 PR PHYS/QHP TELEPHONE EVALUATION 5-10 MIN: CPT | Performed by: NURSE PRACTITIONER

## 2022-06-15 RX ORDER — ERGOCALCIFEROL 1.25 MG/1
50000 CAPSULE ORAL WEEKLY
Qty: 13 CAPSULE | Refills: 3 | Status: SHIPPED | OUTPATIENT
Start: 2022-06-15

## 2022-06-15 RX ORDER — CHLORAL HYDRATE 500 MG
1000 CAPSULE ORAL 2 TIMES DAILY WITH MEALS
Qty: 60 CAPSULE | Refills: 5 | Status: SHIPPED | OUTPATIENT
Start: 2022-06-15

## 2022-06-15 NOTE — PROGRESS NOTES
Subjective   Srini Ruiz is a 22 y.o. male.     Patient presents to the clinic via telehealth through telephone to establish care.  Patient recently established care with behavioral health, and had baseline lab work drawn. Lab work revealed that patient has vitamin D insufficiency and elevated triglycerides. Pt denies acute illness or concerns at this time       The following portions of the patient's history were reviewed and updated as appropriate: allergies, current medications, past family history, past medical history, past social history, past surgical history and problem list.    Review of Systems   All other systems reviewed and are negative.    See history of Present Illness     Objective     Virtual Visit Physical Exam    No flowsheet data found.          Assessment & Plan     Problems Addressed this Visit    None     Visit Diagnoses     Encounter to establish care    -  Primary    Relevant Medications    vitamin D (ERGOCALCIFEROL) 1.25 MG (36198 UT) capsule capsule    Omega-3 Fatty Acids (fish oil) 1000 MG capsule capsule    Vitamin D insufficiency        Relevant Medications    vitamin D (ERGOCALCIFEROL) 1.25 MG (29758 UT) capsule capsule    High triglycerides        Relevant Medications    Omega-3 Fatty Acids (fish oil) 1000 MG capsule capsule      Diagnoses       Codes Comments    Encounter to establish care    -  Primary ICD-10-CM: Z76.89  ICD-9-CM: V65.8     Vitamin D insufficiency     ICD-10-CM: E55.9  ICD-9-CM: 268.9     High triglycerides     ICD-10-CM: E78.1  ICD-9-CM: 272.1           You have chosen to receive care through a telephone visit. Do you consent to use a telephone visit for your medical care today? Yes    This visit has been rescheduled as a phone visit to comply with patient safety concerns in accordance with CDC recommendations. Total time of discussion was 10 minutes.                 This document has been electronically signed by CONNIE Montalvo  Marline 15, 2022 12:54  EDT    Part of this note may be an electronic transcription/translation of spoken language to printed text using the Dragon Dictation System.

## 2022-08-17 NOTE — PROGRESS NOTES
"Subjective   Srini Ruiz is a 22 y.o. male who presents today for follow up    Chief Complaint:  Anxiety, depression    History of Present Illness:   Today is a follow up visit. Patient is taking medication as directed, denies side effects. Things are \"ok\". He has recovered from right knee surgery.    Anxiety rated 2/10, depression rated 0/10, with 10 being the worst.  Sleep is good, getting about 9 to 11 hours a night, denies NM.  Appetite is good, some days he eats a lot, and some days he doesn't want to eat much.  Denies SI/HI/AVH.  Denies thoughts of self-harm.  No acute physical or medical issues today         The following portions of the patient's history were reviewed and updated as appropriate: allergies, current medications, past family history, past medical history, past social history, past surgical history and problem list.      Past Medical History:  Past Medical History:   Diagnosis Date   • ADHD (attention deficit hyperactivity disorder)    • Congenital heart defect     \"left ventricle - since resolved\" per patient   • Depression    • PTSD (post-traumatic stress disorder)        Social History:  Social History     Socioeconomic History   • Marital status: Single   Tobacco Use   • Smoking status: Current Every Day Smoker     Packs/day: 0.25     Types: Cigarettes   • Smokeless tobacco: Never Used   Vaping Use   • Vaping Use: Former   • Substances: Nicotine, Flavoring   • Devices: Disposable, Refillable tank   Substance and Sexual Activity   • Alcohol use: Not Currently     Comment: \"once in a great while\" per pt   • Drug use: Never   • Sexual activity: Defer       Family History:  Family History   Problem Relation Age of Onset   • Schizophrenia Brother        Past Surgical History:  Past Surgical History:   Procedure Laterality Date   • KNEE ARTHROSCOPY W/ MENISCECTOMY Right 5/16/2022    Procedure: KNEE DIAGNOSTIC ARTHROSCOPY;  Surgeon: Lopez Dubose MD;  Location: [facility];  Service: Orthopedics; " " Laterality: Right;       Problem List:  There is no problem list on file for this patient.      Allergy:   Allergies   Allergen Reactions   • Latex Hives and Swelling   • Amoxicillin Rash        Current Medications:   Current Outpatient Medications   Medication Sig Dispense Refill   • celecoxib (CeleBREX) 200 MG capsule Take 1 capsule by mouth Daily. 14 capsule 0   • OLANZapine (ZyPREXA) 7.5 MG tablet Take 1 tablet by mouth Every Night. 30 tablet 2   • Omega-3 Fatty Acids (fish oil) 1000 MG capsule capsule Take 1 capsule by mouth 2 (Two) Times a Day With Meals. 60 capsule 5   • ondansetron (Zofran) 4 MG tablet Take 1 tablet by mouth Every 8 (Eight) Hours As Needed for Nausea or Vomiting. 20 tablet 0   • prazosin (MINIPRESS) 1 MG capsule Take 1 capsule by mouth Daily As Needed (flashbacks). 30 capsule 2   • propranolol (INDERAL) 20 MG tablet Take 0.5-1 tablets by mouth Daily As Needed (anxiety). Avoid using within 6 hrs of prazosin 30 tablet 2   • vitamin D (ERGOCALCIFEROL) 1.25 MG (79945 UT) capsule capsule Take 1 capsule by mouth 1 (One) Time Per Week. 13 capsule 3   • cephalexin (KEFLEX) 500 MG capsule      • cetirizine (zyrTEC) 10 MG tablet      • naproxen (EC NAPROSYN) 500 MG EC tablet Take 1 tablet by mouth 2 (Two) Times a Day As Needed for Mild Pain . 12 tablet 0   • oxyCODONE (ROXICODONE) 5 MG immediate release tablet Take 1 tablet by mouth Every 6 (Six) Hours As Needed for Moderate Pain . 30 tablet 0     No current facility-administered medications for this visit.       Review of Symptoms:    Review of Systems   Psychiatric/Behavioral: Positive for sleep disturbance and depressed mood. Negative for hallucinations and suicidal ideas. The patient is nervous/anxious.    All other systems reviewed and are negative.      Objective   Physical Exam:   Blood pressure 137/92, pulse 111, height 182.9 cm (72.01\"), weight 111 kg (244 lb 3.2 oz).  Body mass index is 33.11 kg/m².    Appearance:  male appears " stated age, no acute distress noted.    Gait, Station, Strength: Steady, posture erect, WNL      Mental Status Exam: 8/22/22  Hygiene:   good  Cooperation:  Cooperative  Eye Contact:  Good  Psychomotor Behavior:  Appropriate  Affect:  Appropriate  Mood: normal  Hopelessness: Denies  Speech:  Normal  Thought Process:  Goal directed and Linear  Thought Content:  Normal  Suicidal:  None  Homicidal:  None  Hallucinations:  None  Delusion:  None  Memory:  Intact  Orientation:  Person, Place, Time and Situation  Reliability:  fair  Insight:  Fair  Judgement:  Fair  Impulse Control:  Fair  Physical/Medical Issues:  No      PHQ-Score Total:  PHQ-9 Total Score:      Lab Results:   No visits with results within 1 Month(s) from this visit.   Latest known visit with results is:   Lab on 06/01/2022   Component Date Value Ref Range Status   • Prolactin 06/01/2022 6.35  4.04 - 15.20 ng/mL Final       Assessment & Plan   Problems Addressed this Visit    None     Visit Diagnoses     Anxiety disorder, unspecified type    -  Primary    Relevant Medications    propranolol (INDERAL) 20 MG tablet    OLANZapine (ZyPREXA) 7.5 MG tablet    prazosin (MINIPRESS) 1 MG capsule    Current moderate episode of major depressive disorder, unspecified whether recurrent (HCC)-improved        Relevant Medications    OLANZapine (ZyPREXA) 7.5 MG tablet    Delusional disorder, grandiose type (HCC)        Relevant Medications    OLANZapine (ZyPREXA) 7.5 MG tablet    History of autism spectrum disorder        Relevant Medications    OLANZapine (ZyPREXA) 7.5 MG tablet    History of posttraumatic stress disorder (PTSD)        Relevant Medications    OLANZapine (ZyPREXA) 7.5 MG tablet    prazosin (MINIPRESS) 1 MG capsule    Medication management          Diagnoses       Codes Comments    Anxiety disorder, unspecified type    -  Primary ICD-10-CM: F41.9  ICD-9-CM: 300.00     Current moderate episode of major depressive disorder, unspecified whether recurrent  (HCC)-improved     ICD-10-CM: F32.1  ICD-9-CM: 296.22     Delusional disorder, grandiose type (HCC)     ICD-10-CM: F22  ICD-9-CM: 297.1     History of autism spectrum disorder     ICD-10-CM: F84.0  ICD-9-CM: 299.00     History of posttraumatic stress disorder (PTSD)     ICD-10-CM: Z86.59  ICD-9-CM: V11.8     Medication management     ICD-10-CM: Z79.899  ICD-9-CM: V58.69         Social History     Tobacco Use   Smoking Status Current Every Day Smoker   • Packs/day: 0.25   • Types: Cigarettes   Smokeless Tobacco Never Used     ERICA reviewed and appropriate. Patient counseled on use of controlled substances.     -The benefits of a healthy diet and exercise were discussed with patient, especially the positive effects they have on mental health. Patient encouraged to consider lifestyle modification regarding  diet and exercise patterns to maximize results of mental health treatment.  -Reviewed previous available documentation  -Reviewed most recent available labs   -Lengthy discussion with patient on the possible side effects of antipsychotic medications including increased cholesterol, increased blood sugar, and possibility of weight gain.  Also discussed the need to monitor lab work associated with this.  The risk of muscle movement disorders with this class of medication was also discussed.      Visit Diagnoses:    ICD-10-CM ICD-9-CM   1. Anxiety disorder, unspecified type  F41.9 300.00   2. Current moderate episode of major depressive disorder, unspecified whether recurrent (HCC)-improved  F32.1 296.22   3. Delusional disorder, grandiose type (HCC)  F22 297.1   4. History of autism spectrum disorder  F84.0 299.00   5. History of posttraumatic stress disorder (PTSD)  Z86.59 V11.8   6. Medication management  Z79.899 V58.69         TREATMENT PLAN/GOALS: Continue supportive psychotherapy efforts and medications as indicated. Treatment and medication options discussed during today's visit. Patient acknowledged and  verbally consented to continue with current treatment plan and was educated on the importance of compliance with treatment and follow-up appointments.    MEDICATION ISSUES:  Discussed medication options and treatment plan of prescribed medication as well as the risks, benefits, and side effects including potential falls, possible impaired driving and metabolic adversities among others. Patient is agreeable to call the office with any worsening of symptoms or onset of side effects. Patient is agreeable to call 911 or go to the nearest ER should he/she begin having SI/HI.     MEDS ORDERED DURING VISIT:  New Medications Ordered This Visit   Medications   • propranolol (INDERAL) 20 MG tablet     Sig: Take 0.5-1 tablets by mouth Daily As Needed (anxiety). Avoid using within 6 hrs of prazosin     Dispense:  30 tablet     Refill:  2   • OLANZapine (ZyPREXA) 7.5 MG tablet     Sig: Take 1 tablet by mouth Every Night.     Dispense:  30 tablet     Refill:  2   • prazosin (MINIPRESS) 1 MG capsule     Sig: Take 1 capsule by mouth Daily As Needed (flashbacks).     Dispense:  30 capsule     Refill:  2       Return in about 3 months (around 11/22/2022).    -Continue Zyprexa 7.5 mg tablet, take 1 tablet daily for mood   -Continue Prazosin 1 mg tablet at  Night for nightmares (did not send to pharmacy today, he has enough medication at home)  -Continue Propanolol 20 mg take 0.5-1 tablets daily as needed for anxiety        Prognosis: Guarded dependent on medication/follow up and treatment plan compliance.  Functionality: pt showing improvements in important areas of daily functioning.     Short-term goals: Patient will adhere to medication regimen and note continued improvement in symptoms over the next 3 months.   Long-term goals: Patient will be adherent to medication management and psychotherapy with continued improvement in symptoms over the next 6 months    I spent 30 minutes caring for Srini on this date of service. This time  includes time spent by me in the following activities: preparing for the visit, reviewing tests, obtaining and/or reviewing a separately obtained history, performing a medically appropriate examination and/or evaluation, counseling and educating the patient/family/caregiver, ordering medications, tests, or procedures and documenting information in the medical record        This document has been electronically signed by CONNIE Del Valle   August 22, 2022 16:46 EDT    Part of this note may be an electronic transcription/translation of spoken language to printed text using the Dragon Dictation System.

## 2022-08-22 ENCOUNTER — OFFICE VISIT (OUTPATIENT)
Dept: PSYCHIATRY | Facility: CLINIC | Age: 22
End: 2022-08-22

## 2022-08-22 VITALS
SYSTOLIC BLOOD PRESSURE: 137 MMHG | HEART RATE: 111 BPM | BODY MASS INDEX: 33.08 KG/M2 | DIASTOLIC BLOOD PRESSURE: 92 MMHG | WEIGHT: 244.2 LBS | HEIGHT: 72 IN

## 2022-08-22 DIAGNOSIS — Z79.899 MEDICATION MANAGEMENT: ICD-10-CM

## 2022-08-22 DIAGNOSIS — Z86.59 HISTORY OF POSTTRAUMATIC STRESS DISORDER (PTSD): ICD-10-CM

## 2022-08-22 DIAGNOSIS — F41.9 ANXIETY DISORDER, UNSPECIFIED TYPE: Primary | ICD-10-CM

## 2022-08-22 DIAGNOSIS — F84.0 HISTORY OF AUTISM SPECTRUM DISORDER: ICD-10-CM

## 2022-08-22 DIAGNOSIS — F32.1 CURRENT MODERATE EPISODE OF MAJOR DEPRESSIVE DISORDER, UNSPECIFIED WHETHER RECURRENT: ICD-10-CM

## 2022-08-22 DIAGNOSIS — F22: ICD-10-CM

## 2022-08-22 PROCEDURE — 99214 OFFICE O/P EST MOD 30 MIN: CPT | Performed by: NURSE PRACTITIONER

## 2022-08-22 RX ORDER — OLANZAPINE 7.5 MG/1
7.5 TABLET ORAL NIGHTLY
Qty: 30 TABLET | Refills: 2 | Status: SHIPPED | OUTPATIENT
Start: 2022-08-22 | End: 2022-11-23 | Stop reason: SDUPTHER

## 2022-08-22 RX ORDER — PRAZOSIN HYDROCHLORIDE 1 MG/1
1 CAPSULE ORAL DAILY PRN
Qty: 30 CAPSULE | Refills: 2 | Status: SHIPPED | OUTPATIENT
Start: 2022-08-22 | End: 2022-11-23 | Stop reason: SDUPTHER

## 2022-08-22 RX ORDER — PROPRANOLOL HYDROCHLORIDE 20 MG/1
10-20 TABLET ORAL DAILY PRN
Qty: 30 TABLET | Refills: 2 | Status: SHIPPED | OUTPATIENT
Start: 2022-08-22 | End: 2022-11-23 | Stop reason: SDUPTHER

## 2022-10-10 ENCOUNTER — APPOINTMENT (OUTPATIENT)
Dept: CT IMAGING | Facility: HOSPITAL | Age: 22
End: 2022-10-10

## 2022-10-10 ENCOUNTER — HOSPITAL ENCOUNTER (EMERGENCY)
Facility: HOSPITAL | Age: 22
Discharge: HOME | End: 2022-10-10
Attending: STUDENT IN AN ORGANIZED HEALTH CARE EDUCATION/TRAINING PROGRAM | Admitting: STUDENT IN AN ORGANIZED HEALTH CARE EDUCATION/TRAINING PROGRAM

## 2022-10-10 VITALS
RESPIRATION RATE: 17 BRPM | TEMPERATURE: 97.5 F | OXYGEN SATURATION: 98 % | BODY MASS INDEX: 31.83 KG/M2 | HEART RATE: 87 BPM | SYSTOLIC BLOOD PRESSURE: 117 MMHG | DIASTOLIC BLOOD PRESSURE: 83 MMHG | WEIGHT: 235 LBS | HEIGHT: 72 IN

## 2022-10-10 DIAGNOSIS — S39.011A STRAIN OF ABDOMINAL WALL, INITIAL ENCOUNTER: Primary | ICD-10-CM

## 2022-10-10 DIAGNOSIS — K59.00 CONSTIPATION, UNSPECIFIED CONSTIPATION TYPE: ICD-10-CM

## 2022-10-10 LAB
ALBUMIN SERPL-MCNC: 4.61 G/DL (ref 3.5–5.2)
ALBUMIN/GLOB SERPL: 1.5 G/DL
ALP SERPL-CCNC: 103 U/L (ref 39–117)
ALT SERPL W P-5'-P-CCNC: 37 U/L (ref 1–41)
AMPHET+METHAMPHET UR QL: NEGATIVE
AMPHETAMINES UR QL: NEGATIVE
ANION GAP SERPL CALCULATED.3IONS-SCNC: 10.3 MMOL/L (ref 5–15)
AST SERPL-CCNC: 20 U/L (ref 1–40)
BARBITURATES UR QL SCN: NEGATIVE
BASOPHILS # BLD AUTO: 0.04 10*3/MM3 (ref 0–0.2)
BASOPHILS NFR BLD AUTO: 0.6 % (ref 0–1.5)
BENZODIAZ UR QL SCN: NEGATIVE
BILIRUB SERPL-MCNC: 0.3 MG/DL (ref 0–1.2)
BILIRUB UR QL STRIP: NEGATIVE
BUN SERPL-MCNC: 9 MG/DL (ref 6–20)
BUN/CREAT SERPL: 12.2 (ref 7–25)
BUPRENORPHINE SERPL-MCNC: NEGATIVE NG/ML
CALCIUM SPEC-SCNC: 9.2 MG/DL (ref 8.6–10.5)
CANNABINOIDS SERPL QL: NEGATIVE
CHLORIDE SERPL-SCNC: 104 MMOL/L (ref 98–107)
CLARITY UR: ABNORMAL
CO2 SERPL-SCNC: 25.7 MMOL/L (ref 22–29)
COCAINE UR QL: NEGATIVE
COLOR UR: YELLOW
CREAT SERPL-MCNC: 0.74 MG/DL (ref 0.76–1.27)
CRP SERPL-MCNC: <0.3 MG/DL (ref 0–0.5)
DEPRECATED RDW RBC AUTO: 40.5 FL (ref 37–54)
EGFRCR SERPLBLD CKD-EPI 2021: 130.6 ML/MIN/1.73
EOSINOPHIL # BLD AUTO: 0.14 10*3/MM3 (ref 0–0.4)
EOSINOPHIL NFR BLD AUTO: 2 % (ref 0.3–6.2)
ERYTHROCYTE [DISTWIDTH] IN BLOOD BY AUTOMATED COUNT: 12.7 % (ref 12.3–15.4)
GLOBULIN UR ELPH-MCNC: 3 GM/DL
GLUCOSE SERPL-MCNC: 116 MG/DL (ref 65–99)
GLUCOSE UR STRIP-MCNC: NEGATIVE MG/DL
HCT VFR BLD AUTO: 46.4 % (ref 37.5–51)
HGB BLD-MCNC: 15.5 G/DL (ref 13–17.7)
HGB UR QL STRIP.AUTO: NEGATIVE
IMM GRANULOCYTES # BLD AUTO: 0.01 10*3/MM3 (ref 0–0.05)
IMM GRANULOCYTES NFR BLD AUTO: 0.1 % (ref 0–0.5)
KETONES UR QL STRIP: NEGATIVE
LEUKOCYTE ESTERASE UR QL STRIP.AUTO: NEGATIVE
LIPASE SERPL-CCNC: 19 U/L (ref 13–60)
LYMPHOCYTES # BLD AUTO: 2.84 10*3/MM3 (ref 0.7–3.1)
LYMPHOCYTES NFR BLD AUTO: 41 % (ref 19.6–45.3)
MCH RBC QN AUTO: 29.1 PG (ref 26.6–33)
MCHC RBC AUTO-ENTMCNC: 33.4 G/DL (ref 31.5–35.7)
MCV RBC AUTO: 87.1 FL (ref 79–97)
METHADONE UR QL SCN: NEGATIVE
MONOCYTES # BLD AUTO: 0.44 10*3/MM3 (ref 0.1–0.9)
MONOCYTES NFR BLD AUTO: 6.3 % (ref 5–12)
NEUTROPHILS NFR BLD AUTO: 3.46 10*3/MM3 (ref 1.7–7)
NEUTROPHILS NFR BLD AUTO: 50 % (ref 42.7–76)
NITRITE UR QL STRIP: NEGATIVE
NRBC BLD AUTO-RTO: 0 /100 WBC (ref 0–0.2)
OPIATES UR QL: NEGATIVE
OXYCODONE UR QL SCN: NEGATIVE
PCP UR QL SCN: NEGATIVE
PH UR STRIP.AUTO: 7 [PH] (ref 5–8)
PLATELET # BLD AUTO: 289 10*3/MM3 (ref 140–450)
PMV BLD AUTO: 9.1 FL (ref 6–12)
POTASSIUM SERPL-SCNC: 3.5 MMOL/L (ref 3.5–5.2)
PROPOXYPH UR QL: NEGATIVE
PROT SERPL-MCNC: 7.6 G/DL (ref 6–8.5)
PROT UR QL STRIP: NEGATIVE
RBC # BLD AUTO: 5.33 10*6/MM3 (ref 4.14–5.8)
SODIUM SERPL-SCNC: 140 MMOL/L (ref 136–145)
SP GR UR STRIP: 1.03 (ref 1–1.03)
TRICYCLICS UR QL SCN: NEGATIVE
UROBILINOGEN UR QL STRIP: ABNORMAL
WBC NRBC COR # BLD: 6.93 10*3/MM3 (ref 3.4–10.8)

## 2022-10-10 PROCEDURE — 74177 CT ABD & PELVIS W/CONTRAST: CPT

## 2022-10-10 PROCEDURE — 74177 CT ABD & PELVIS W/CONTRAST: CPT | Performed by: RADIOLOGY

## 2022-10-10 PROCEDURE — 80053 COMPREHEN METABOLIC PANEL: CPT | Performed by: PHYSICIAN ASSISTANT

## 2022-10-10 PROCEDURE — 99283 EMERGENCY DEPT VISIT LOW MDM: CPT

## 2022-10-10 PROCEDURE — 81003 URINALYSIS AUTO W/O SCOPE: CPT | Performed by: PHYSICIAN ASSISTANT

## 2022-10-10 PROCEDURE — 85025 COMPLETE CBC W/AUTO DIFF WBC: CPT | Performed by: PHYSICIAN ASSISTANT

## 2022-10-10 PROCEDURE — 80306 DRUG TEST PRSMV INSTRMNT: CPT | Performed by: PHYSICIAN ASSISTANT

## 2022-10-10 PROCEDURE — 86140 C-REACTIVE PROTEIN: CPT | Performed by: PHYSICIAN ASSISTANT

## 2022-10-10 PROCEDURE — 25010000002 IOPAMIDOL 61 % SOLUTION: Performed by: STUDENT IN AN ORGANIZED HEALTH CARE EDUCATION/TRAINING PROGRAM

## 2022-10-10 PROCEDURE — 83690 ASSAY OF LIPASE: CPT | Performed by: PHYSICIAN ASSISTANT

## 2022-10-10 RX ORDER — SODIUM CHLORIDE 0.9 % (FLUSH) 0.9 %
10 SYRINGE (ML) INJECTION AS NEEDED
Status: DISCONTINUED | OUTPATIENT
Start: 2022-10-10 | End: 2022-10-10 | Stop reason: HOSPADM

## 2022-10-10 RX ADMIN — IOPAMIDOL 80 ML: 612 INJECTION, SOLUTION INTRAVENOUS at 17:46

## 2022-10-10 NOTE — ED PROVIDER NOTES
"Subjective   History of Present Illness  This is a 22 year old male patient who presents to the ER with chief complaint of abdominal pain. No PMH. He was [place] rides [place] a festival on [place] when he sustained a mild lower abdominal injury. He has had lower abdominal pain since that time that has been mild, intermittent and aching. No true alleviating or aggravating factors. No fever. No urinary symptoms. No constipation or diarrhea.         Review of Systems   Constitutional: Negative.  Negative for fever.   HENT: Negative.    Respiratory: Negative.    Cardiovascular: Negative.  Negative for chest pain.   Gastrointestinal: Positive for abdominal pain. Negative for abdominal distention, anal bleeding, blood in stool, constipation, diarrhea, nausea, rectal pain and vomiting.   Endocrine: Negative.    Genitourinary: Negative.  Negative for dysuria.   Skin: Negative.    Neurological: Negative.    Psychiatric/Behavioral: Negative.    All other systems reviewed and are negative.      Past Medical History:   Diagnosis Date   • ADHD (attention deficit hyperactivity disorder)    • Congenital heart defect     \"left ventricle - since resolved\" per patient   • Depression    • PTSD (post-traumatic stress disorder)        Allergies   Allergen Reactions   • Latex Hives and Swelling   • Amoxicillin Rash       Past Surgical History:   Procedure Laterality Date   • KNEE ARTHROSCOPY W/ MENISCECTOMY Right 5/16/2022    Procedure: KNEE DIAGNOSTIC ARTHROSCOPY;  Surgeon: Lopez Dubose MD;  Location: [facility];  Service: Orthopedics;  Laterality: Right;       Family History   Problem Relation Age of Onset   • Schizophrenia Brother        Social History     Socioeconomic History   • Marital status: Single   Tobacco Use   • Smoking status: Every Day     Packs/day: 0.25     Types: Cigarettes   • Smokeless tobacco: Never   Vaping Use   • Vaping Use: Former   • Substances: Nicotine, Flavoring   • Devices: Disposable, Refillable tank " "  Substance and Sexual Activity   • Alcohol use: Not Currently     Comment: \"once in a great while\" per pt   • Drug use: Never   • Sexual activity: Defer           Objective   Physical Exam  Vitals and nursing note reviewed.   Constitutional:       General: He is not in acute distress.     Appearance: He is well-developed. He is not diaphoretic.   HENT:      Head: Normocephalic and atraumatic.      Right Ear: External ear normal.      Left Ear: External ear normal.      Nose: Nose normal.   Eyes:      Conjunctiva/sclera: Conjunctivae normal.      Pupils: Pupils are equal, round, and reactive to light.   Neck:      Vascular: No JVD.      Trachea: No tracheal deviation.   Cardiovascular:      Rate and Rhythm: Normal rate and regular rhythm.      Heart sounds: Normal heart sounds. No murmur heard.  Pulmonary:      Effort: Pulmonary effort is normal. No respiratory distress.      Breath sounds: Normal breath sounds. No wheezing.   Abdominal:      General: Bowel sounds are normal.      Palpations: Abdomen is soft.      Tenderness: There is abdominal tenderness in the right lower quadrant and left lower quadrant. There is no right CVA tenderness, left CVA tenderness, guarding or rebound.   Musculoskeletal:         General: No deformity. Normal range of motion.      Cervical back: Normal range of motion and neck supple.   Skin:     General: Skin is warm and dry.      Coloration: Skin is not pale.      Findings: No erythema or rash.   Neurological:      Mental Status: He is alert and oriented to person, place, and time.      Cranial Nerves: No cranial nerve deficit.   Psychiatric:         Behavior: Behavior normal.         Thought Content: Thought content normal.         Procedures           ED Course  ED Course as of 10/10/22 1845   Mon Oct 10, 2022   0139 CT Abdomen Pelvis With Contrast  IMPRESSION:     1. No solid organ injury or hemoperitoneum     2.Moderate stool burden                    This report was finalized on " 10/10/2022 6:07 PM by Dr. Kishore Delatorre MD [MM]   1844 Patient diagnosed with abdominal wall muscle strain and constipation. Will f/u with PCP in 2 days or return to ER if symptoms worsen.  [MM]      ED Course User Index  [MM] Rosemary Looney PA                                           MDM  Number of Diagnoses or Management Options     Amount and/or Complexity of Data Reviewed  Clinical lab tests: ordered and reviewed  Tests in the radiology section of CPT®: ordered and reviewed  Discuss the patient with other providers: yes        Final diagnoses:   Strain of abdominal wall, initial encounter   Constipation, unspecified constipation type       ED Disposition  ED Disposition     ED Disposition   Discharge    Condition   Stable    Comment   --             Dusty Kemp, APRN  [address] [id number]  [phone]    In 2 days           Medication List      No changes were made to your prescriptions during this visit.          Rosemary Looney PA  10/10/22 184

## 2022-11-18 NOTE — PROGRESS NOTES
"Subjective   Srini Ruiz is a 22 y.o. male who presents today for follow up    Chief Complaint:  Anxiety, depression    History of Present Illness:   History of Present Illness  Today is a follow up visit. Patient is taking medication as directed, denies side effects. Things are going pretty good. He is dealing with a left wrist sprain.  Anxiety rated 6/10, depression rated 2-3/10, with 10 being the worst.  Sleep is good, getting about 10 hours a night, denies NM.  Appetite is up and down.  Denies SI/HI/AVH.  Denies thoughts of self-harm.  No acute physical or medical issues today         The following portions of the patient's history were reviewed and updated as appropriate: allergies, current medications, past family history, past medical history, past social history, past surgical history and problem list.      Past Medical History:  Past Medical History:   Diagnosis Date   • ADHD (attention deficit hyperactivity disorder)    • Congenital heart defect     \"left ventricle - since resolved\" per patient   • Depression    • PTSD (post-traumatic stress disorder)        Social History:  Social History     Socioeconomic History   • Marital status: Single   Tobacco Use   • Smoking status: Every Day     Packs/day: 0.25     Types: Cigarettes   • Smokeless tobacco: Never   Vaping Use   • Vaping Use: Former   • Substances: Nicotine, Flavoring   • Devices: Disposable, Refillable tank   Substance and Sexual Activity   • Alcohol use: Not Currently     Comment: \"once in a great while\" per pt   • Drug use: Never   • Sexual activity: Defer       Family History:  Family History   Problem Relation Age of Onset   • Schizophrenia Brother        Past Surgical History:  Past Surgical History:   Procedure Laterality Date   • KNEE ARTHROSCOPY W/ MENISCECTOMY Right 5/16/2022    Procedure: KNEE DIAGNOSTIC ARTHROSCOPY;  Surgeon: Lopez Dubose MD;  Location: [facility];  Service: Orthopedics;  Laterality: Right;       Problem List:  There " is no problem list on file for this patient.      Allergy:   Allergies   Allergen Reactions   • Latex Hives and Swelling   • Amoxicillin Rash        Current Medications:   Current Outpatient Medications   Medication Sig Dispense Refill   • celecoxib (CeleBREX) 200 MG capsule Take 1 capsule by mouth Daily. 14 capsule 0   • cephalexin (KEFLEX) 500 MG capsule      • cetirizine (zyrTEC) 10 MG tablet      • naproxen (EC NAPROSYN) 500 MG EC tablet Take 1 tablet by mouth 2 (Two) Times a Day As Needed for Mild Pain . 12 tablet 0   • OLANZapine (ZyPREXA) 7.5 MG tablet Take 1 tablet by mouth Every Night. 30 tablet 2   • Omega-3 Fatty Acids (fish oil) 1000 MG capsule capsule Take 1 capsule by mouth 2 (Two) Times a Day With Meals. 60 capsule 5   • ondansetron (Zofran) 4 MG tablet Take 1 tablet by mouth Every 8 (Eight) Hours As Needed for Nausea or Vomiting. 20 tablet 0   • oxyCODONE (ROXICODONE) 5 MG immediate release tablet Take 1 tablet by mouth Every 6 (Six) Hours As Needed for Moderate Pain . 30 tablet 0   • prazosin (MINIPRESS) 1 MG capsule Take 1 capsule by mouth Daily As Needed (flashbacks). 30 capsule 2   • propranolol (INDERAL) 20 MG tablet Take 0.5-1 tablets by mouth Daily As Needed (anxiety). Avoid using within 6 hrs of prazosin 30 tablet 2   • vitamin D (ERGOCALCIFEROL) 1.25 MG (86629 UT) capsule capsule Take 1 capsule by mouth 1 (One) Time Per Week. 13 capsule 3     No current facility-administered medications for this visit.       Review of Symptoms:    Review of Systems   Psychiatric/Behavioral: Positive for sleep disturbance and depressed mood. Negative for dysphoric mood, hallucinations and suicidal ideas. The patient is nervous/anxious.    All other systems reviewed and are negative.      Objective   Physical Exam:   There were no vitals taken for this visit.  There is no height or weight on file to calculate BMI.    Appearance:  male appears stated age, no acute distress noted.    Gait, Station,  Strength: Steady, posture erect, WNL      Mental Status Exam: 11/23/22  Hygiene:   good  Cooperation:  Cooperative  Eye Contact:  Good  Psychomotor Behavior:  Appropriate  Affect:  Appropriate  Mood: normal  Hopelessness: Denies  Speech:  Normal  Thought Process:  Linear  Thought Content:  Mood congruent  Suicidal:  None  Homicidal:  None  Hallucinations:  None  Delusion:  None  Memory:  Intact  Orientation:  Person, Place, Time and Situation  Reliability:  fair  Insight:  Fair  Judgement:  Fair  Impulse Control:  Fair  Physical/Medical Issues:  No      PHQ-Score Total:  PHQ-9 Total Score:      Lab Results:   No visits with results within 1 Month(s) from this visit.   Latest known visit with results is:   Admission on 10/10/2022, Discharged on 10/10/2022   Component Date Value Ref Range Status   • Glucose 10/10/2022 116 (H)  65 - 99 mg/dL Final   • BUN 10/10/2022 9  6 - 20 mg/dL Final   • Creatinine 10/10/2022 0.74 (L)  0.76 - 1.27 mg/dL Final   • Sodium 10/10/2022 140  136 - 145 mmol/L Final   • Potassium 10/10/2022 3.5  3.5 - 5.2 mmol/L Final   • Chloride 10/10/2022 104  98 - 107 mmol/L Final   • CO2 10/10/2022 25.7  22.0 - 29.0 mmol/L Final   • Calcium 10/10/2022 9.2  8.6 - 10.5 mg/dL Final   • Total Protein 10/10/2022 7.6  6.0 - 8.5 g/dL Final   • Albumin 10/10/2022 4.61  3.50 - 5.20 g/dL Final   • ALT (SGPT) 10/10/2022 37  1 - 41 U/L Final   • AST (SGOT) 10/10/2022 20  1 - 40 U/L Final   • Alkaline Phosphatase 10/10/2022 103  39 - 117 U/L Final   • Total Bilirubin 10/10/2022 0.3  0.0 - 1.2 mg/dL Final   • Globulin 10/10/2022 3.0  gm/dL Final   • A/G Ratio 10/10/2022 1.5  g/dL Final   • BUN/Creatinine Ratio 10/10/2022 12.2  7.0 - 25.0 Final   • Anion Gap 10/10/2022 10.3  5.0 - 15.0 mmol/L Final   • eGFR 10/10/2022 130.6  >60.0 mL/min/1.73 Final    National Kidney Foundation and American Society of Nephrology (ASN) Task Force recommended calculation based on the Chronic Kidney Disease Epidemiology Collaboration  (CKD-EPI) equation refit without adjustment for race.   • Lipase 10/10/2022 19  13 - 60 U/L Final   • C-Reactive Protein 10/10/2022 <0.30  0.00 - 0.50 mg/dL Final   • Color, UA 10/10/2022 Yellow  Yellow, Straw Final   • Appearance, UA 10/10/2022 Cloudy (A)  Clear Final   • pH, UA 10/10/2022 7.0  5.0 - 8.0 Final   • Specific Gravity, UA 10/10/2022 1.026  1.005 - 1.030 Final   • Glucose, UA 10/10/2022 Negative  Negative Final   • Ketones, UA 10/10/2022 Negative  Negative Final   • Bilirubin, UA 10/10/2022 Negative  Negative Final   • Blood, UA 10/10/2022 Negative  Negative Final   • Protein, UA 10/10/2022 Negative  Negative Final   • Leuk Esterase, UA 10/10/2022 Negative  Negative Final   • Nitrite, UA 10/10/2022 Negative  Negative Final   • Urobilinogen, UA 10/10/2022 1.0 E.U./dL  0.2 - 1.0 E.U./dL Final   • THC, Screen, Urine 10/10/2022 Negative  Negative Final   • Phencyclidine (PCP), Urine 10/10/2022 Negative  Negative Final   • Cocaine Screen, Urine 10/10/2022 Negative  Negative Final   • Methamphetamine, Ur 10/10/2022 Negative  Negative Final   • Opiate Screen 10/10/2022 Negative  Negative Final   • Amphetamine Screen, Urine 10/10/2022 Negative  Negative Final   • Benzodiazepine Screen, Urine 10/10/2022 Negative  Negative Final   • Tricyclic Antidepressants Screen 10/10/2022 Negative  Negative Final   • Methadone Screen, Urine 10/10/2022 Negative  Negative Final   • Barbiturates Screen, Urine 10/10/2022 Negative  Negative Final   • Oxycodone Screen, Urine 10/10/2022 Negative  Negative Final   • Propoxyphene Screen 10/10/2022 Negative  Negative Final   • Buprenorphine, Screen, Urine 10/10/2022 Negative  Negative Final   • WBC 10/10/2022 6.93  3.40 - 10.80 10*3/mm3 Final   • RBC 10/10/2022 5.33  4.14 - 5.80 10*6/mm3 Final   • Hemoglobin 10/10/2022 15.5  13.0 - 17.7 g/dL Final   • Hematocrit 10/10/2022 46.4  37.5 - 51.0 % Final   • MCV 10/10/2022 87.1  79.0 - 97.0 fL Final   • MCH 10/10/2022 29.1  26.6 - 33.0 pg  Final   • MCHC 10/10/2022 33.4  31.5 - 35.7 g/dL Final   • RDW 10/10/2022 12.7  12.3 - 15.4 % Final   • RDW-SD 10/10/2022 40.5  37.0 - 54.0 fl Final   • MPV 10/10/2022 9.1  6.0 - 12.0 fL Final   • Platelets 10/10/2022 289  140 - 450 10*3/mm3 Final   • Neutrophil % 10/10/2022 50.0  42.7 - 76.0 % Final   • Lymphocyte % 10/10/2022 41.0  19.6 - 45.3 % Final   • Monocyte % 10/10/2022 6.3  5.0 - 12.0 % Final   • Eosinophil % 10/10/2022 2.0  0.3 - 6.2 % Final   • Basophil % 10/10/2022 0.6  0.0 - 1.5 % Final   • Immature Grans % 10/10/2022 0.1  0.0 - 0.5 % Final   • Neutrophils, Absolute 10/10/2022 3.46  1.70 - 7.00 10*3/mm3 Final   • Lymphocytes, Absolute 10/10/2022 2.84  0.70 - 3.10 10*3/mm3 Final   • Monocytes, Absolute 10/10/2022 0.44  0.10 - 0.90 10*3/mm3 Final   • Eosinophils, Absolute 10/10/2022 0.14  0.00 - 0.40 10*3/mm3 Final   • Basophils, Absolute 10/10/2022 0.04  0.00 - 0.20 10*3/mm3 Final   • Immature Grans, Absolute 10/10/2022 0.01  0.00 - 0.05 10*3/mm3 Final   • nRBC 10/10/2022 0.0  0.0 - 0.2 /100 WBC Final       Assessment & Plan   Problems Addressed this Visit    None  Visit Diagnoses     Anxiety disorder, unspecified type    -  Primary    Current moderate episode of major depressive disorder, unspecified whether recurrent (HCC)-improved        Delusional disorder, grandiose type (HCC)        History of autism spectrum disorder        Medication management          Diagnoses       Codes Comments    Anxiety disorder, unspecified type    -  Primary ICD-10-CM: F41.9  ICD-9-CM: 300.00     Current moderate episode of major depressive disorder, unspecified whether recurrent (HCC)-improved     ICD-10-CM: F32.1  ICD-9-CM: 296.22     Delusional disorder, grandiose type (HCC)     ICD-10-CM: F22  ICD-9-CM: 297.1     History of autism spectrum disorder     ICD-10-CM: F84.0  ICD-9-CM: 299.00     Medication management     ICD-10-CM: Z79.899  ICD-9-CM: V58.69         Social History     Tobacco Use   Smoking Status Every  Day   • Packs/day: 0.25   • Types: Cigarettes   Smokeless Tobacco Never     ERICA reviewed and appropriate. Patient counseled on use of controlled substances.     -The benefits of a healthy diet and exercise were discussed with patient, especially the positive effects they have on mental health. Patient encouraged to consider lifestyle modification regarding  diet and exercise patterns to maximize results of mental health treatment.  -Reviewed previous available documentation  -Reviewed most recent available labs   -Lengthy discussion with patient on the possible side effects of antipsychotic medications including increased cholesterol, increased blood sugar, and possibility of weight gain.  Also discussed the need to monitor lab work associated with this.  The risk of muscle movement disorders with this class of medication was also discussed.      Visit Diagnoses:    ICD-10-CM ICD-9-CM   1. Anxiety disorder, unspecified type  F41.9 300.00   2. Current moderate episode of major depressive disorder, unspecified whether recurrent (HCC)-improved  F32.1 296.22   3. Delusional disorder, grandiose type (HCC)  F22 297.1   4. History of autism spectrum disorder  F84.0 299.00   5. Medication management  Z79.899 V58.69         TREATMENT PLAN/GOALS: Continue supportive psychotherapy efforts and medications as indicated. Treatment and medication options discussed during today's visit. Patient acknowledged and verbally consented to continue with current treatment plan and was educated on the importance of compliance with treatment and follow-up appointments.    MEDICATION ISSUES:  Discussed medication options and treatment plan of prescribed medication as well as the risks, benefits, and side effects including potential falls, possible impaired driving and metabolic adversities among others. Patient is agreeable to call the office with any worsening of symptoms or onset of side effects. Patient is agreeable to call 911 or go to  the nearest ER should he/she begin having SI/HI.     MEDS ORDERED DURING VISIT:  No orders of the defined types were placed in this encounter.      No follow-ups on file.    -Continue Zyprexa 7.5 mg tablet, take 1 tablet daily for mood   -Continue Prazosin 1 mg tablet at  Night for nightmares (  -Continue Propanolol 20 mg take 0.5-1 tablets daily as needed for anxiety        Prognosis: Guarded dependent on medication/follow up and treatment plan compliance.  Functionality: pt showing improvements in important areas of daily functioning.     Short-term goals: Patient will adhere to medication regimen and note continued improvement in symptoms over the next 3 months.   Long-term goals: Patient will be adherent to medication management and psychotherapy with continued improvement in symptoms over the next 6 months    I spent 30 minutes caring for Srini on this date of service. This time includes time spent by me in the following activities: preparing for the visit, obtaining and/or reviewing a separately obtained history, performing a medically appropriate examination and/or evaluation, counseling and educating the patient/family/caregiver, ordering medications, tests, or procedures and documenting information in the medical record        This document has been electronically signed by Rebekah Saldivar, CONNIE   November 18, 2022 15:20 EST    Part of this note may be an electronic transcription/translation of spoken language to printed text using the Dragon Dictation System.

## 2022-11-23 ENCOUNTER — PRIOR AUTHORIZATION (OUTPATIENT)
Dept: PSYCHIATRY | Facility: CLINIC | Age: 22
End: 2022-11-23

## 2022-11-23 ENCOUNTER — OFFICE VISIT (OUTPATIENT)
Dept: PSYCHIATRY | Facility: CLINIC | Age: 22
End: 2022-11-23

## 2022-11-23 VITALS
SYSTOLIC BLOOD PRESSURE: 134 MMHG | WEIGHT: 246 LBS | BODY MASS INDEX: 33.32 KG/M2 | HEART RATE: 99 BPM | HEIGHT: 72 IN | DIASTOLIC BLOOD PRESSURE: 84 MMHG

## 2022-11-23 DIAGNOSIS — Z86.59 HISTORY OF POSTTRAUMATIC STRESS DISORDER (PTSD): ICD-10-CM

## 2022-11-23 DIAGNOSIS — Z79.899 MEDICATION MANAGEMENT: ICD-10-CM

## 2022-11-23 DIAGNOSIS — F22: ICD-10-CM

## 2022-11-23 DIAGNOSIS — F84.0 HISTORY OF AUTISM SPECTRUM DISORDER: ICD-10-CM

## 2022-11-23 DIAGNOSIS — F41.9 ANXIETY DISORDER, UNSPECIFIED TYPE: Primary | ICD-10-CM

## 2022-11-23 DIAGNOSIS — F32.1 CURRENT MODERATE EPISODE OF MAJOR DEPRESSIVE DISORDER, UNSPECIFIED WHETHER RECURRENT: ICD-10-CM

## 2022-11-23 PROCEDURE — 99214 OFFICE O/P EST MOD 30 MIN: CPT | Performed by: NURSE PRACTITIONER

## 2022-11-23 RX ORDER — OLANZAPINE 7.5 MG/1
7.5 TABLET ORAL NIGHTLY
Qty: 30 TABLET | Refills: 2 | Status: SHIPPED | OUTPATIENT
Start: 2022-11-23 | End: 2023-02-23 | Stop reason: SDUPTHER

## 2022-11-23 RX ORDER — PRAZOSIN HYDROCHLORIDE 1 MG/1
1 CAPSULE ORAL DAILY PRN
Qty: 30 CAPSULE | Refills: 2 | Status: SHIPPED | OUTPATIENT
Start: 2022-11-23 | End: 2023-02-23 | Stop reason: SDUPTHER

## 2022-11-23 RX ORDER — PROPRANOLOL HYDROCHLORIDE 20 MG/1
10-20 TABLET ORAL DAILY PRN
Qty: 30 TABLET | Refills: 2 | Status: SHIPPED | OUTPATIENT
Start: 2022-11-23 | End: 2023-02-23 | Stop reason: SDUPTHER

## 2023-02-15 NOTE — PROGRESS NOTES
"Subjective   Srini Ruiz is a 22 y.o. male who presents today for follow up    Chief Complaint:  Anxiety, depression    History of Present Illness:   History of Present Illness  Today is a follow up visit. Patient is taking medication as directed, denies side effects. He may have to have wrist surgery, he is seeing Dr. Carpio.  Anxiety rated 5-6/10, depression rated 0/10, with 10 being the worst.  Sleep is good, getting about 8-9 hours a night, denies NM.  Appetite is has decreased, he is pleased with that, he is trying to lose weight..  Denies SI/HI/AVH.  Denies thoughts of self-harm.  No acute physical or medical issues today       The following portions of the patient's history were reviewed and updated as appropriate: allergies, current medications, past family history, past medical history, past social history, past surgical history and problem list.      Past Medical History:  Past Medical History:   Diagnosis Date   • ADHD (attention deficit hyperactivity disorder)    • Congenital heart defect     \"left ventricle - since resolved\" per patient   • Depression    • PTSD (post-traumatic stress disorder)        Social History:  Social History     Socioeconomic History   • Marital status: Single   Tobacco Use   • Smoking status: Every Day     Packs/day: 0.25     Types: Cigarettes   • Smokeless tobacco: Never   Vaping Use   • Vaping Use: Former   • Substances: Nicotine, Flavoring   • Devices: Disposable, Refillable tank   Substance and Sexual Activity   • Alcohol use: Not Currently     Comment: \"once in a great while\" per pt   • Drug use: Never   • Sexual activity: Defer       Family History:  Family History   Problem Relation Age of Onset   • Schizophrenia Brother        Past Surgical History:  Past Surgical History:   Procedure Laterality Date   • KNEE ARTHROSCOPY W/ MENISCECTOMY Right 5/16/2022    Procedure: KNEE DIAGNOSTIC ARTHROSCOPY;  Surgeon: Lopez Dubose MD;  Location: [facility];  Service: Orthopedics;  " "Laterality: Right;       Problem List:  There is no problem list on file for this patient.      Allergy:   Allergies   Allergen Reactions   • Latex Hives and Swelling   • Amoxicillin Rash        Current Medications:   Current Outpatient Medications   Medication Sig Dispense Refill   • OLANZapine (ZyPREXA) 7.5 MG tablet Take 1 tablet by mouth Every Night. 30 tablet 2   • prazosin (MINIPRESS) 1 MG capsule Take 1 capsule by mouth Daily As Needed (flashbacks). 30 capsule 2   • propranolol (INDERAL) 20 MG tablet Take 0.5-1 tablets by mouth Daily As Needed (anxiety). Avoid using within 6 hrs of prazosin 30 tablet 2   • Omega-3 Fatty Acids (fish oil) 1000 MG capsule capsule Take 1 capsule by mouth 2 (Two) Times a Day With Meals. 60 capsule 5   • ondansetron (Zofran) 4 MG tablet Take 1 tablet by mouth Every 8 (Eight) Hours As Needed for Nausea or Vomiting. 20 tablet 0   • vitamin D (ERGOCALCIFEROL) 1.25 MG (39469 UT) capsule capsule Take 1 capsule by mouth 1 (One) Time Per Week. 13 capsule 3     No current facility-administered medications for this visit.       Review of Symptoms:    Review of Systems   Musculoskeletal: Positive for arthralgias.   Psychiatric/Behavioral: Positive for sleep disturbance, depressed mood and stress. Negative for hallucinations, self-injury and suicidal ideas. The patient is nervous/anxious.    All other systems reviewed and are negative.      Objective   Physical Exam:   Blood pressure 130/70, pulse 107, height 182.9 cm (72.01\"), weight 116 kg (256 lb 6.4 oz).  Body mass index is 34.77 kg/m².    Appearance:  male appears stated age, no acute distress noted.    Gait, Station, Strength: Steady, posture erect, WNL      Mental Status Exam: 2/23/23  Hygiene:   good  Cooperation:  Cooperative  Eye Contact:  Good  Psychomotor Behavior:  Appropriate  Affect:  Appropriate  Mood: normal  Hopelessness: Denies  Speech:  Normal  Thought Process:  Linear  Thought Content:  Mood congruent  Suicidal:  " None  Homicidal:  None  Hallucinations:  None  Delusion:  None  Memory:  Intact  Orientation:  Person, Place, Time and Situation  Reliability:  fair  Insight:  Fair  Judgement:  Fair  Impulse Control:  Fair  Physical/Medical Issues:  No      PHQ-Score Total:  PHQ-9 Total Score:      Lab Results:   No visits with results within 1 Month(s) from this visit.   Latest known visit with results is:   Admission on 10/10/2022, Discharged on 10/10/2022   Component Date Value Ref Range Status   • Glucose 10/10/2022 116 (H)  65 - 99 mg/dL Final   • BUN 10/10/2022 9  6 - 20 mg/dL Final   • Creatinine 10/10/2022 0.74 (L)  0.76 - 1.27 mg/dL Final   • Sodium 10/10/2022 140  136 - 145 mmol/L Final   • Potassium 10/10/2022 3.5  3.5 - 5.2 mmol/L Final   • Chloride 10/10/2022 104  98 - 107 mmol/L Final   • CO2 10/10/2022 25.7  22.0 - 29.0 mmol/L Final   • Calcium 10/10/2022 9.2  8.6 - 10.5 mg/dL Final   • Total Protein 10/10/2022 7.6  6.0 - 8.5 g/dL Final   • Albumin 10/10/2022 4.61  3.50 - 5.20 g/dL Final   • ALT (SGPT) 10/10/2022 37  1 - 41 U/L Final   • AST (SGOT) 10/10/2022 20  1 - 40 U/L Final   • Alkaline Phosphatase 10/10/2022 103  39 - 117 U/L Final   • Total Bilirubin 10/10/2022 0.3  0.0 - 1.2 mg/dL Final   • Globulin 10/10/2022 3.0  gm/dL Final   • A/G Ratio 10/10/2022 1.5  g/dL Final   • BUN/Creatinine Ratio 10/10/2022 12.2  7.0 - 25.0 Final   • Anion Gap 10/10/2022 10.3  5.0 - 15.0 mmol/L Final   • eGFR 10/10/2022 130.6  >60.0 mL/min/1.73 Final    National Kidney Foundation and American Society of Nephrology (ASN) Task Force recommended calculation based on the Chronic Kidney Disease Epidemiology Collaboration (CKD-EPI) equation refit without adjustment for race.   • Lipase 10/10/2022 19  13 - 60 U/L Final   • C-Reactive Protein 10/10/2022 <0.30  0.00 - 0.50 mg/dL Final   • Color, UA 10/10/2022 Yellow  Yellow, Straw Final   • Appearance, UA 10/10/2022 Cloudy (A)  Clear Final   • pH, UA 10/10/2022 7.0  5.0 - 8.0 Final   •  Specific [place], UA 10/10/2022 1.026  1.005 - 1.030 Final   • Glucose, UA 10/10/2022 Negative  Negative Final   • Ketones, UA 10/10/2022 Negative  Negative Final   • Bilirubin, UA 10/10/2022 Negative  Negative Final   • Blood, UA 10/10/2022 Negative  Negative Final   • Protein, UA 10/10/2022 Negative  Negative Final   • Leuk Esterase, UA 10/10/2022 Negative  Negative Final   • Nitrite, UA 10/10/2022 Negative  Negative Final   • Urobilinogen, UA 10/10/2022 1.0 E.U./dL  0.2 - 1.0 E.U./dL Final   • THC, Screen, Urine 10/10/2022 Negative  Negative Final   • Phencyclidine (PCP), Urine 10/10/2022 Negative  Negative Final   • Cocaine Screen, Urine 10/10/2022 Negative  Negative Final   • Methamphetamine, Ur 10/10/2022 Negative  Negative Final   • Opiate Screen 10/10/2022 Negative  Negative Final   • Amphetamine Screen, Urine 10/10/2022 Negative  Negative Final   • Benzodiazepine Screen, Urine 10/10/2022 Negative  Negative Final   • Tricyclic Antidepressants Screen 10/10/2022 Negative  Negative Final   • Methadone Screen, Urine 10/10/2022 Negative  Negative Final   • Barbiturates Screen, Urine 10/10/2022 Negative  Negative Final   • Oxycodone Screen, Urine 10/10/2022 Negative  Negative Final   • Propoxyphene Screen 10/10/2022 Negative  Negative Final   • Buprenorphine, Screen, Urine 10/10/2022 Negative  Negative Final   • WBC 10/10/2022 6.93  3.40 - 10.80 10*3/mm3 Final   • RBC 10/10/2022 5.33  4.14 - 5.80 10*6/mm3 Final   • Hemoglobin 10/10/2022 15.5  13.0 - 17.7 g/dL Final   • Hematocrit 10/10/2022 46.4  37.5 - 51.0 % Final   • MCV 10/10/2022 87.1  79.0 - 97.0 fL Final   • MCH 10/10/2022 29.1  26.6 - 33.0 pg Final   • MCHC 10/10/2022 33.4  31.5 - 35.7 g/dL Final   • RDW 10/10/2022 12.7  12.3 - 15.4 % Final   • RDW-SD 10/10/2022 40.5  37.0 - 54.0 fl Final   • MPV 10/10/2022 9.1  6.0 - 12.0 fL Final   • Platelets 10/10/2022 289  140 - 450 10*3/mm3 Final   • Neutrophil % 10/10/2022 50.0  42.7 - 76.0 % Final   • Lymphocyte %  10/10/2022 41.0  19.6 - 45.3 % Final   • Monocyte % 10/10/2022 6.3  5.0 - 12.0 % Final   • Eosinophil % 10/10/2022 2.0  0.3 - 6.2 % Final   • Basophil % 10/10/2022 0.6  0.0 - 1.5 % Final   • Immature Grans % 10/10/2022 0.1  0.0 - 0.5 % Final   • Neutrophils, Absolute 10/10/2022 3.46  1.70 - 7.00 10*3/mm3 Final   • Lymphocytes, Absolute 10/10/2022 2.84  0.70 - 3.10 10*3/mm3 Final   • Monocytes, Absolute 10/10/2022 0.44  0.10 - 0.90 10*3/mm3 Final   • Eosinophils, Absolute 10/10/2022 0.14  0.00 - 0.40 10*3/mm3 Final   • Basophils, Absolute 10/10/2022 0.04  0.00 - 0.20 10*3/mm3 Final   • Immature Grans, Absolute 10/10/2022 0.01  0.00 - 0.05 10*3/mm3 Final   • nRBC 10/10/2022 0.0  0.0 - 0.2 /100 WBC Final       Assessment & Plan   Problems Addressed this Visit    None  Visit Diagnoses     Anxiety disorder, unspecified type    -  Primary    Relevant Medications    propranolol (INDERAL) 20 MG tablet    prazosin (MINIPRESS) 1 MG capsule    OLANZapine (ZyPREXA) 7.5 MG tablet    Current moderate episode of major depressive disorder, unspecified whether recurrent (HCC)-improved        Relevant Medications    OLANZapine (ZyPREXA) 7.5 MG tablet    Delusional disorder, grandiose type (HCC)        Relevant Medications    OLANZapine (ZyPREXA) 7.5 MG tablet    History of autism spectrum disorder        Relevant Medications    OLANZapine (ZyPREXA) 7.5 MG tablet    Medication management        History of posttraumatic stress disorder (PTSD)        Relevant Medications    prazosin (MINIPRESS) 1 MG capsule    OLANZapine (ZyPREXA) 7.5 MG tablet      Diagnoses       Codes Comments    Anxiety disorder, unspecified type    -  Primary ICD-10-CM: F41.9  ICD-9-CM: 300.00     Current moderate episode of major depressive disorder, unspecified whether recurrent (HCC)-improved     ICD-10-CM: F32.1  ICD-9-CM: 296.22     Delusional disorder, grandiose type (HCC)     ICD-10-CM: F22  ICD-9-CM: 297.1     History of autism spectrum disorder      ICD-10-CM: F84.0  ICD-9-CM: 299.00     Medication management     ICD-10-CM: Z79.899  ICD-9-CM: V58.69     History of posttraumatic stress disorder (PTSD)     ICD-10-CM: Z86.59  ICD-9-CM: V11.8         Social History     Tobacco Use   Smoking Status Every Day   • Packs/day: 0.25   • Types: Cigarettes   Smokeless Tobacco Never     ERICA reviewed and appropriate. Patient counseled on use of controlled substances.     -The benefits of a healthy diet and exercise were discussed with patient, especially the positive effects they have on mental health. Patient encouraged to consider lifestyle modification regarding  diet and exercise patterns to maximize results of mental health treatment.  -Reviewed previous available documentation  -Reviewed most recent available labs   -Lengthy discussion with patient on the possible side effects of antipsychotic medications including increased cholesterol, increased blood sugar, and possibility of weight gain.  Also discussed the need to monitor lab work associated with this.  The risk of muscle movement disorders with this class of medication was also discussed.      Visit Diagnoses:    ICD-10-CM ICD-9-CM   1. Anxiety disorder, unspecified type  F41.9 300.00   2. Current moderate episode of major depressive disorder, unspecified whether recurrent (HCC)-improved  F32.1 296.22   3. Delusional disorder, grandiose type (HCC)  F22 297.1   4. History of autism spectrum disorder  F84.0 299.00   5. Medication management  Z79.899 V58.69   6. History of posttraumatic stress disorder (PTSD)  Z86.59 V11.8         TREATMENT PLAN/GOALS: Continue supportive psychotherapy efforts and medications as indicated. Treatment and medication options discussed during today's visit. Patient acknowledged and verbally consented to continue with current treatment plan and was educated on the importance of compliance with treatment and follow-up appointments.    MEDICATION ISSUES:  Discussed medication options  and treatment plan of prescribed medication as well as the risks, benefits, and side effects including potential falls, possible impaired driving and metabolic adversities among others. Patient is agreeable to call the office with any worsening of symptoms or onset of side effects. Patient is agreeable to call 911 or go to the nearest ER should he/she begin having SI/HI.     MEDS ORDERED DURING VISIT:  New Medications Ordered This Visit   Medications   • propranolol (INDERAL) 20 MG tablet     Sig: Take 0.5-1 tablets by mouth Daily As Needed (anxiety). Avoid using within 6 hrs of prazosin     Dispense:  30 tablet     Refill:  2   • prazosin (MINIPRESS) 1 MG capsule     Sig: Take 1 capsule by mouth Daily As Needed (flashbacks).     Dispense:  30 capsule     Refill:  2   • OLANZapine (ZyPREXA) 7.5 MG tablet     Sig: Take 1 tablet by mouth Every Night.     Dispense:  30 tablet     Refill:  2       Return in about 3 months (around 5/23/2023).    -Continue Zyprexa 7.5 mg tablet, take 1 tablet daily for mood   -Continue Prazosin 1 mg tablet at  Night for nightmares (  -Continue Propanolol 20 mg take 0.5-1 tablets daily as needed for anxiety        Prognosis: Guarded dependent on medication/follow up and treatment plan compliance.  Functionality: pt showing improvements in important areas of daily functioning.     Short-term goals: Patient will adhere to medication regimen and note continued improvement in symptoms over the next 3 months.   Long-term goals: Patient will be adherent to medication management and psychotherapy with continued improvement in symptoms over the next 6 months    I spent 30 minutes caring for Srini on this date of service. This time includes time spent by me in the following activities: preparing for the visit, obtaining and/or reviewing a separately obtained history, performing a medically appropriate examination and/or evaluation, counseling and educating the patient/family/caregiver, ordering  medications, tests, or procedures and documenting information in the medical record        This document has been electronically signed by CONNIE Childers   February 23, 2023 14:34 EST    Part of this note may be an electronic transcription/translation of spoken language to printed text using the Dragon Dictation System.

## 2023-02-23 ENCOUNTER — OFFICE VISIT (OUTPATIENT)
Dept: PSYCHIATRY | Facility: CLINIC | Age: 23
End: 2023-02-23
Payer: COMMERCIAL

## 2023-02-23 VITALS
HEART RATE: 107 BPM | HEIGHT: 72 IN | WEIGHT: 256.4 LBS | DIASTOLIC BLOOD PRESSURE: 70 MMHG | BODY MASS INDEX: 34.73 KG/M2 | SYSTOLIC BLOOD PRESSURE: 130 MMHG

## 2023-02-23 DIAGNOSIS — F32.1 CURRENT MODERATE EPISODE OF MAJOR DEPRESSIVE DISORDER, UNSPECIFIED WHETHER RECURRENT: ICD-10-CM

## 2023-02-23 DIAGNOSIS — F84.0 HISTORY OF AUTISM SPECTRUM DISORDER: ICD-10-CM

## 2023-02-23 DIAGNOSIS — Z86.59 HISTORY OF POSTTRAUMATIC STRESS DISORDER (PTSD): ICD-10-CM

## 2023-02-23 DIAGNOSIS — F22: ICD-10-CM

## 2023-02-23 DIAGNOSIS — F41.9 ANXIETY DISORDER, UNSPECIFIED TYPE: Primary | ICD-10-CM

## 2023-02-23 DIAGNOSIS — Z79.899 MEDICATION MANAGEMENT: ICD-10-CM

## 2023-02-23 PROCEDURE — 99214 OFFICE O/P EST MOD 30 MIN: CPT | Performed by: NURSE PRACTITIONER

## 2023-02-23 RX ORDER — OLANZAPINE 7.5 MG/1
7.5 TABLET ORAL NIGHTLY
Qty: 30 TABLET | Refills: 2 | Status: SHIPPED | OUTPATIENT
Start: 2023-02-23

## 2023-02-23 RX ORDER — PROPRANOLOL HYDROCHLORIDE 20 MG/1
10-20 TABLET ORAL DAILY PRN
Qty: 30 TABLET | Refills: 2 | Status: SHIPPED | OUTPATIENT
Start: 2023-02-23

## 2023-02-23 RX ORDER — PRAZOSIN HYDROCHLORIDE 1 MG/1
1 CAPSULE ORAL DAILY PRN
Qty: 30 CAPSULE | Refills: 2 | Status: SHIPPED | OUTPATIENT
Start: 2023-02-23

## 2023-06-14 NOTE — PROGRESS NOTES
"Subjective   Srini Ruiz is a 23 y.o. male who presents today for follow up    Chief Complaint:  Anxiety, depression    History of Present Illness:   History of Present Illness  Today is a follow-up visit.     Medication compliance: patient is compliant with medications, denies SE.  Depression rated 2/10, with 10 being the worst.  Anxiety rated 8/10, with 10 being the worst.    Sleep is good, getting about 7 hours a night,  Nightmares: Denies  Appetite is good.  Hallucinations: Patient denies auditory hallucinations and visual hallucinations  Self-harm: Patient denies thoughts of self-harm.  Alcohol use: no  Drug use: no  Marijuana use: no     Chronic health issues, no acute physical or medical issues today.    Details: He has been recently diagnosed with HTN, was given Lisinopril. Overall he states he is doing ok. He plans on moving to [place] next year. He may have to have surgery on his wrist.      The following portions of the patient's history were reviewed and updated as appropriate: allergies, current medications, past family history, past medical history, past social history, past surgical history and problem list.      Past Medical History:  Past Medical History:   Diagnosis Date    ADHD (attention deficit hyperactivity disorder)     Congenital heart defect     \"left ventricle - since resolved\" per patient    Depression     PTSD (post-traumatic stress disorder)        Social History:  Social History     Socioeconomic History    Marital status: Single   Tobacco Use    Smoking status: Every Day     Packs/day: 0.25     Types: Cigarettes    Smokeless tobacco: Never   Vaping Use    Vaping Use: Former    Substances: Nicotine, Flavoring    Devices: Disposable, Refillable tank   Substance and Sexual Activity    Alcohol use: Not Currently     Comment: \"once in a great while\" per pt    Drug use: Never    Sexual activity: Defer       Family History:  Family History   Problem Relation Age of Onset    Schizophrenia " "Brother        Past Surgical History:  Past Surgical History:   Procedure Laterality Date    KNEE ARTHROSCOPY W/ MENISCECTOMY Right 5/16/2022    Procedure: KNEE DIAGNOSTIC ARTHROSCOPY;  Surgeon: Lopez Dubose MD;  Location[facility];  Service: Orthopedics;  Laterality: Right;       Problem List:  There is no problem list on file for this patient.      Allergy:   Allergies   Allergen Reactions    Latex Hives and Swelling    Amoxicillin Rash        Current Medications:   Current Outpatient Medications   Medication Sig Dispense Refill    lisinopril (PRINIVIL,ZESTRIL) 10 MG tablet Take 1 tablet by mouth Daily.      OLANZapine (ZyPREXA) 7.5 MG tablet Take 1 tablet by mouth Every Night. 30 tablet 2    Omega-3 Fatty Acids (fish oil) 1000 MG capsule capsule Take 1 capsule by mouth 2 (Two) Times a Day With Meals. 60 capsule 5    ondansetron (Zofran) 4 MG tablet Take 1 tablet by mouth Every 8 (Eight) Hours As Needed for Nausea or Vomiting. 20 tablet 0    prazosin (MINIPRESS) 1 MG capsule Take 1 capsule by mouth Daily As Needed (flashbacks). 30 capsule 2    propranolol (INDERAL) 20 MG tablet Take 0.5-1 tablets by mouth Daily As Needed (anxiety). Avoid using within 6 hrs of prazosin 30 tablet 2    vitamin D (ERGOCALCIFEROL) 1.25 MG (70467 UT) capsule capsule Take 1 capsule by mouth 1 (One) Time Per Week. 13 capsule 3     No current facility-administered medications for this visit.       Review of Symptoms:    Review of Systems   Musculoskeletal:  Positive for arthralgias.   Psychiatric/Behavioral:  Positive for sleep disturbance, depressed mood and stress. Negative for dysphoric mood, hallucinations, self-injury and suicidal ideas. The patient is nervous/anxious.    All other systems reviewed and are negative.    Objective   Physical Exam:   Blood pressure 130/80, pulse 95, height 182.9 cm (72.01\"), weight 108 kg (237 lb).  Body mass index is 32.14 kg/m².    Appearance:  male appears stated age, no acute distress " noted.    Gait, Station, Strength: Steady, posture erect, WNL    6/16/23    MENTAL STATUS EXAM   General Appearance:  Cleanly groomed and dressed  Eye Contact:  Good eye contact  Attitude:  Cooperative  Motor Activity:  Normal gait, posture  Speech:  Hyper talkative  Language:  Spontaneous  Mood and affect:  Normal, pleasant  Hopelessness:  Denies  Thought Process:  Logical  Associations/ Thought Content:  No delusions  Hallucinations:  None  Suicidal Ideations:  Not present  Homicidal Ideation:  Not present  Sensorium:  Alert  Orientation:  Person, place, time and situation  Insight:  Fair  Judgement:  Fair  Reliability:  Fair  Impulse Control:  Fair      PHQ-Score Total:  PHQ-9 Total Score:      Lab Results:   No visits with results within 1 Month(s) from this visit.   Latest known visit with results is:   Admission on 10/10/2022, Discharged on 10/10/2022   Component Date Value Ref Range Status    Glucose 10/10/2022 116 (H)  65 - 99 mg/dL Final    BUN 10/10/2022 9  6 - 20 mg/dL Final    Creatinine 10/10/2022 0.74 (L)  0.76 - 1.27 mg/dL Final    Sodium 10/10/2022 140  136 - 145 mmol/L Final    Potassium 10/10/2022 3.5  3.5 - 5.2 mmol/L Final    Chloride 10/10/2022 104  98 - 107 mmol/L Final    CO2 10/10/2022 25.7  22.0 - 29.0 mmol/L Final    Calcium 10/10/2022 9.2  8.6 - 10.5 mg/dL Final    Total Protein 10/10/2022 7.6  6.0 - 8.5 g/dL Final    Albumin 10/10/2022 4.61  3.50 - 5.20 g/dL Final    ALT (SGPT) 10/10/2022 37  1 - 41 U/L Final    AST (SGOT) 10/10/2022 20  1 - 40 U/L Final    Alkaline Phosphatase 10/10/2022 103  39 - 117 U/L Final    Total Bilirubin 10/10/2022 0.3  0.0 - 1.2 mg/dL Final    Globulin 10/10/2022 3.0  gm/dL Final    A/G Ratio 10/10/2022 1.5  g/dL Final    BUN/Creatinine Ratio 10/10/2022 12.2  7.0 - 25.0 Final    Anion Gap 10/10/2022 10.3  5.0 - 15.0 mmol/L Final    eGFR 10/10/2022 130.6  >60.0 mL/min/1.73 Final    National Kidney Foundation and American Society of Nephrology (ASN) Task Force  recommended calculation based on the Chronic Kidney Disease Epidemiology Collaboration (CKD-EPI) equation refit without adjustment for race.    Lipase 10/10/2022 19  13 - 60 U/L Final    C-Reactive Protein 10/10/2022 <0.30  0.00 - 0.50 mg/dL Final    Color, UA 10/10/2022 Yellow  Yellow, Straw Final    Appearance, UA 10/10/2022 Cloudy (A)  Clear Final    pH, UA 10/10/2022 7.0  5.0 - 8.0 Final    Specific Gravity, UA 10/10/2022 1.026  1.005 - 1.030 Final    Glucose, UA 10/10/2022 Negative  Negative Final    Ketones, UA 10/10/2022 Negative  Negative Final    Bilirubin, UA 10/10/2022 Negative  Negative Final    Blood, UA 10/10/2022 Negative  Negative Final    Protein, UA 10/10/2022 Negative  Negative Final    Leuk Esterase, UA 10/10/2022 Negative  Negative Final    Nitrite, UA 10/10/2022 Negative  Negative Final    Urobilinogen, UA 10/10/2022 1.0 E.U./dL  0.2 - 1.0 E.U./dL Final    THC, Screen, Urine 10/10/2022 Negative  Negative Final    Phencyclidine (PCP), Urine 10/10/2022 Negative  Negative Final    Cocaine Screen, Urine 10/10/2022 Negative  Negative Final    Methamphetamine, Ur 10/10/2022 Negative  Negative Final    Opiate Screen 10/10/2022 Negative  Negative Final    Amphetamine Screen, Urine 10/10/2022 Negative  Negative Final    Benzodiazepine Screen, Urine 10/10/2022 Negative  Negative Final    Tricyclic Antidepressants Screen 10/10/2022 Negative  Negative Final    Methadone Screen, Urine 10/10/2022 Negative  Negative Final    Barbiturates Screen, Urine 10/10/2022 Negative  Negative Final    Oxycodone Screen, Urine 10/10/2022 Negative  Negative Final    Propoxyphene Screen 10/10/2022 Negative  Negative Final    Buprenorphine, Screen, Urine 10/10/2022 Negative  Negative Final    WBC 10/10/2022 6.93  3.40 - 10.80 10*3/mm3 Final    RBC 10/10/2022 5.33  4.14 - 5.80 10*6/mm3 Final    Hemoglobin 10/10/2022 15.5  13.0 - 17.7 g/dL Final    Hematocrit 10/10/2022 46.4  37.5 - 51.0 % Final    MCV 10/10/2022 87.1  79.0 -  97.0 fL Final    MCH 10/10/2022 29.1  26.6 - 33.0 pg Final    MCHC 10/10/2022 33.4  31.5 - 35.7 g/dL Final    RDW 10/10/2022 12.7  12.3 - 15.4 % Final    RDW-SD 10/10/2022 40.5  37.0 - 54.0 fl Final    MPV 10/10/2022 9.1  6.0 - 12.0 fL Final    Platelets 10/10/2022 289  140 - 450 10*3/mm3 Final    Neutrophil % 10/10/2022 50.0  42.7 - 76.0 % Final    Lymphocyte % 10/10/2022 41.0  19.6 - 45.3 % Final    Monocyte % 10/10/2022 6.3  5.0 - 12.0 % Final    Eosinophil % 10/10/2022 2.0  0.3 - 6.2 % Final    Basophil % 10/10/2022 0.6  0.0 - 1.5 % Final    Immature Grans % 10/10/2022 0.1  0.0 - 0.5 % Final    Neutrophils, Absolute 10/10/2022 3.46  1.70 - 7.00 10*3/mm3 Final    Lymphocytes, Absolute 10/10/2022 2.84  0.70 - 3.10 10*3/mm3 Final    Monocytes, Absolute 10/10/2022 0.44  0.10 - 0.90 10*3/mm3 Final    Eosinophils, Absolute 10/10/2022 0.14  0.00 - 0.40 10*3/mm3 Final    Basophils, Absolute 10/10/2022 0.04  0.00 - 0.20 10*3/mm3 Final    Immature Grans, Absolute 10/10/2022 0.01  0.00 - 0.05 10*3/mm3 Final    nRBC 10/10/2022 0.0  0.0 - 0.2 /100 WBC Final       Assessment & Plan   Problems Addressed this Visit    None  Visit Diagnoses       Current moderate episode of major depressive disorder, unspecified whether recurrent    -  Primary    Relevant Medications    OLANZapine (ZyPREXA) 7.5 MG tablet    Delusional disorder, grandiose type        Relevant Medications    OLANZapine (ZyPREXA) 7.5 MG tablet    Anxiety disorder, unspecified type        Relevant Medications    prazosin (MINIPRESS) 1 MG capsule    OLANZapine (ZyPREXA) 7.5 MG tablet    propranolol (INDERAL) 20 MG tablet    History of autism spectrum disorder        Relevant Medications    OLANZapine (ZyPREXA) 7.5 MG tablet    Medication management        History of posttraumatic stress disorder (PTSD)        Relevant Medications    prazosin (MINIPRESS) 1 MG capsule    OLANZapine (ZyPREXA) 7.5 MG tablet    Current moderate episode of major depressive disorder,  unspecified whether recurrent (HCC)-improved        Relevant Medications    OLANZapine (ZyPREXA) 7.5 MG tablet          Diagnoses         Codes Comments    Current moderate episode of major depressive disorder, unspecified whether recurrent    -  Primary ICD-10-CM: F32.1  ICD-9-CM: 296.22     Delusional disorder, grandiose type     ICD-10-CM: F22  ICD-9-CM: 297.1     Anxiety disorder, unspecified type     ICD-10-CM: F41.9  ICD-9-CM: 300.00     History of autism spectrum disorder     ICD-10-CM: F84.0  ICD-9-CM: 299.00     Medication management     ICD-10-CM: Z79.899  ICD-9-CM: V58.69     History of posttraumatic stress disorder (PTSD)     ICD-10-CM: Z86.59  ICD-9-CM: V11.8     Current moderate episode of major depressive disorder, unspecified whether recurrent (HCC)-improved     ICD-10-CM: F32.1  ICD-9-CM: 296.22           Social History     Tobacco Use   Smoking Status Every Day    Packs/day: 0.25    Types: Cigarettes   Smokeless Tobacco Never     ERICA reviewed and appropriate. Patient counseled on use of controlled substances.     -The benefits of a healthy diet and exercise were discussed with patient, especially the positive effects they have on mental health. Patient encouraged to consider lifestyle modification regarding  diet and exercise patterns to maximize results of mental health treatment.  -Reviewed previous available documentation  -Reviewed most recent available labs   -Lengthy discussion with patient on the possible side effects of antipsychotic medications including increased cholesterol, increased blood sugar, and possibility of weight gain.  Also discussed the need to monitor lab work associated with this.  The risk of muscle movement disorders with this class of medication was also discussed.      Visit Diagnoses:    ICD-10-CM ICD-9-CM   1. Current moderate episode of major depressive disorder, unspecified whether recurrent  F32.1 296.22   2. Delusional disorder, grandiose type  F22 297.1   3.  Anxiety disorder, unspecified type  F41.9 300.00   4. History of autism spectrum disorder  F84.0 299.00   5. Medication management  Z79.899 V58.69   6. History of posttraumatic stress disorder (PTSD)  Z86.59 V11.8   7. Current moderate episode of major depressive disorder, unspecified whether recurrent (HCC)-improved  F32.1 296.22         TREATMENT PLAN/GOALS: Continue supportive psychotherapy efforts and medications as indicated. Treatment and medication options discussed during today's visit. Patient acknowledged and verbally consented to continue with current treatment plan and was educated on the importance of compliance with treatment and follow-up appointments.    MEDICATION ISSUES:  Discussed medication options and treatment plan of prescribed medication as well as the risks, benefits, and side effects including potential falls, possible impaired driving and metabolic adversities among others. Patient is agreeable to call the office with any worsening of symptoms or onset of side effects. Patient is agreeable to call 911 or go to the nearest ER should he/she begin having SI/HI.     MEDS ORDERED DURING VISIT:  New Medications Ordered This Visit   Medications    prazosin (MINIPRESS) 1 MG capsule     Sig: Take 1 capsule by mouth Daily As Needed (flashbacks).     Dispense:  30 capsule     Refill:  2    OLANZapine (ZyPREXA) 7.5 MG tablet     Sig: Take 1 tablet by mouth Every Night.     Dispense:  30 tablet     Refill:  2    propranolol (INDERAL) 20 MG tablet     Sig: Take 0.5-1 tablets by mouth Daily As Needed (anxiety). Avoid using within 6 hrs of prazosin     Dispense:  30 tablet     Refill:  2       Return in about 3 months (around 9/16/2023).    -Continue Zyprexa 7.5 mg tablet, take 1 tablet daily for mood   -Continue Prazosin 1 mg tablet at  Night for nightmares (  -Continue Propanolol 20 mg take 0.5-1 tablets daily as needed for anxiety        Prognosis: Guarded dependent on medication/follow up and  treatment plan compliance.  Functionality: pt showing improvements in important areas of daily functioning.     Short-term goals: Patient will adhere to medication regimen and note continued improvement in symptoms over the next 3 months.   Long-term goals: Patient will be adherent to medication management and psychotherapy with continued improvement in symptoms over the next 6 months    I spent 30 minutes caring for Srini on this date of service. This time includes time spent by me in the following activities: preparing for the visit, obtaining and/or reviewing a separately obtained history, performing a medically appropriate examination and/or evaluation, counseling and educating the patient/family/caregiver, ordering medications, tests, or procedures, and documenting information in the medical record          This document has been electronically signed by CONNIE Childers   June 16, 2023 14:16 EDT    Part of this note may be an electronic transcription/translation of spoken language to printed text using the Dragon Dictation System.

## 2023-06-16 ENCOUNTER — OFFICE VISIT (OUTPATIENT)
Dept: PSYCHIATRY | Facility: CLINIC | Age: 23
End: 2023-06-16
Payer: COMMERCIAL

## 2023-06-16 VITALS
HEART RATE: 95 BPM | WEIGHT: 237 LBS | DIASTOLIC BLOOD PRESSURE: 80 MMHG | SYSTOLIC BLOOD PRESSURE: 130 MMHG | BODY MASS INDEX: 32.1 KG/M2 | HEIGHT: 72 IN

## 2023-06-16 DIAGNOSIS — F32.1 CURRENT MODERATE EPISODE OF MAJOR DEPRESSIVE DISORDER, UNSPECIFIED WHETHER RECURRENT: Primary | ICD-10-CM

## 2023-06-16 DIAGNOSIS — F84.0 HISTORY OF AUTISM SPECTRUM DISORDER: ICD-10-CM

## 2023-06-16 DIAGNOSIS — F22: ICD-10-CM

## 2023-06-16 DIAGNOSIS — Z86.59 HISTORY OF POSTTRAUMATIC STRESS DISORDER (PTSD): ICD-10-CM

## 2023-06-16 DIAGNOSIS — Z79.899 MEDICATION MANAGEMENT: ICD-10-CM

## 2023-06-16 DIAGNOSIS — F41.9 ANXIETY DISORDER, UNSPECIFIED TYPE: ICD-10-CM

## 2023-06-16 DIAGNOSIS — F32.1 CURRENT MODERATE EPISODE OF MAJOR DEPRESSIVE DISORDER, UNSPECIFIED WHETHER RECURRENT: ICD-10-CM

## 2023-06-16 RX ORDER — PRAZOSIN HYDROCHLORIDE 1 MG/1
1 CAPSULE ORAL DAILY PRN
Qty: 30 CAPSULE | Refills: 2 | Status: SHIPPED | OUTPATIENT
Start: 2023-06-16

## 2023-06-16 RX ORDER — OLANZAPINE 7.5 MG/1
7.5 TABLET ORAL NIGHTLY
Qty: 30 TABLET | Refills: 2 | Status: SHIPPED | OUTPATIENT
Start: 2023-06-16

## 2023-06-16 RX ORDER — PROPRANOLOL HYDROCHLORIDE 20 MG/1
10-20 TABLET ORAL DAILY PRN
Qty: 30 TABLET | Refills: 2 | Status: SHIPPED | OUTPATIENT
Start: 2023-06-16

## 2023-06-16 RX ORDER — LISINOPRIL 10 MG/1
10 TABLET ORAL DAILY
COMMUNITY

## 2023-09-27 NOTE — PROGRESS NOTES
"Subjective   Srini Ruiz is a 23 y.o. male who presents today for follow up    Chief Complaint:  Anxiety, depression    History of Present Illness:   History of Present Illness  Today is a follow-up visit.     Medication compliance: patient is compliant with medications, denies SE.  Depression rated 3/10, with 10 being the worst.  Anxiety rated 9/10, with 10 being the worst.    Sleep is good, getting about 10 hours a night,  Nightmares: Denies  Appetite is fair.  Hallucinations: Patient denies auditory hallucinations and visual hallucinations  Self-harm: Patient denies thoughts of self-harm.  Alcohol use: yes , occasionally  Drug use: no  Marijuana use: no     Chronic health issues, no acute physical or medical issues today.    Details: He states he is moving to [place] on 10/18/23, his wife wants to be closer to her family. Overall he states medication is doing ok.        The following portions of the patient's history were reviewed and updated as appropriate: allergies, current medications, past family history, past medical history, past social history, past surgical history and problem list.      Past Medical History:  Past Medical History:   Diagnosis Date    ADHD (attention deficit hyperactivity disorder)     Congenital heart defect     \"left ventricle - since resolved\" per patient    Depression     PTSD (post-traumatic stress disorder)        Social History:  Social History     Socioeconomic History    Marital status: Single   Tobacco Use    Smoking status: Every Day     Packs/day: 0.25     Types: Cigarettes    Smokeless tobacco: Never   Vaping Use    Vaping Use: Former    Substances: Nicotine, Flavoring    Devices: Disposable, Refillable tank   Substance and Sexual Activity    Alcohol use: Not Currently     Comment: \"once in a great while\" per pt    Drug use: Never    Sexual activity: Defer       Family History:  Family History   Problem Relation Age of Onset    Schizophrenia Brother        Past Surgical " "History:  Past Surgical History:   Procedure Laterality Date    KNEE ARTHROSCOPY W/ MENISCECTOMY Right 5/16/2022    Procedure: KNEE DIAGNOSTIC ARTHROSCOPY;  Surgeon: Lopez Dubose MD;  Location: [facility];  Service: Orthopedics;  Laterality: Right;       Problem List:  There is no problem list on file for this patient.      Allergy:   Allergies   Allergen Reactions    Latex Hives and Swelling    Amoxicillin Rash        Current Medications:   Current Outpatient Medications   Medication Sig Dispense Refill    lisinopril (PRINIVIL,ZESTRIL) 10 MG tablet Take 1 tablet by mouth Daily.      naproxen (NAPROSYN) 500 MG tablet       OLANZapine (ZyPREXA) 7.5 MG tablet Take 1 tablet by mouth Every Night. 30 tablet 2    ondansetron (Zofran) 4 MG tablet Take 1 tablet by mouth Every 8 (Eight) Hours As Needed for Nausea or Vomiting. 20 tablet 0    prazosin (MINIPRESS) 1 MG capsule Take 1 capsule by mouth Daily As Needed (flashbacks). 30 capsule 2    propranolol (INDERAL) 20 MG tablet Take 0.5-1 tablets by mouth Daily As Needed (anxiety). Avoid using within 6 hrs of prazosin 30 tablet 2    Omega-3 Fatty Acids (fish oil) 1000 MG capsule capsule Take 1 capsule by mouth 2 (Two) Times a Day With Meals. (Patient not taking: Reported on 10/2/2023) 60 capsule 5    vitamin D (ERGOCALCIFEROL) 1.25 MG (03878 UT) capsule capsule Take 1 capsule by mouth 1 (One) Time Per Week. (Patient not taking: Reported on 10/2/2023) 13 capsule 3     No current facility-administered medications for this visit.       Review of Symptoms:    Review of Systems   Musculoskeletal:  Positive for arthralgias.   Psychiatric/Behavioral:  Positive for sleep disturbance, depressed mood and stress. Negative for dysphoric mood, hallucinations, self-injury and suicidal ideas. The patient is nervous/anxious.    All other systems reviewed and are negative.    Objective   Physical Exam:   Blood pressure 128/93, pulse 92, height 182.9 cm (72.01\"), weight 117 kg (258 lb), SpO2 " 98 %.  Body mass index is 34.98 kg/m².    10/2/23    MENTAL STATUS EXAM   General Appearance:  Cleanly groomed and dressed  Eye Contact:  Good eye contact  Attitude:  Cooperative  Motor Activity:  Normal gait, posture  Speech:  Hyper talkative  Language:  Spontaneous  Mood and affect:  Normal, pleasant  Hopelessness:  Denies  Thought Process:  Logical  Associations/ Thought Content:  No delusions  Hallucinations:  None  Suicidal Ideations:  Not present  Homicidal Ideation:  Not present  Sensorium:  Alert  Orientation:  Person, place, time and situation  Insight:  Fair  Judgement:  Fair  Reliability:  Fair  Impulse Control:  Fair      PHQ-Score Total:  PHQ-9 Total Score: 1    Lab Results:   No visits with results within 1 Month(s) from this visit.   Latest known visit with results is:   Admission on 10/10/2022, Discharged on 10/10/2022   Component Date Value Ref Range Status    Glucose 10/10/2022 116 (H)  65 - 99 mg/dL Final    BUN 10/10/2022 9  6 - 20 mg/dL Final    Creatinine 10/10/2022 0.74 (L)  0.76 - 1.27 mg/dL Final    Sodium 10/10/2022 140  136 - 145 mmol/L Final    Potassium 10/10/2022 3.5  3.5 - 5.2 mmol/L Final    Chloride 10/10/2022 104  98 - 107 mmol/L Final    CO2 10/10/2022 25.7  22.0 - 29.0 mmol/L Final    Calcium 10/10/2022 9.2  8.6 - 10.5 mg/dL Final    Total Protein 10/10/2022 7.6  6.0 - 8.5 g/dL Final    Albumin 10/10/2022 4.61  3.50 - 5.20 g/dL Final    ALT (SGPT) 10/10/2022 37  1 - 41 U/L Final    AST (SGOT) 10/10/2022 20  1 - 40 U/L Final    Alkaline Phosphatase 10/10/2022 103  39 - 117 U/L Final    Total Bilirubin 10/10/2022 0.3  0.0 - 1.2 mg/dL Final    Globulin 10/10/2022 3.0  gm/dL Final    A/G Ratio 10/10/2022 1.5  g/dL Final    BUN/Creatinine Ratio 10/10/2022 12.2  7.0 - 25.0 Final    Anion Gap 10/10/2022 10.3  5.0 - 15.0 mmol/L Final    eGFR 10/10/2022 130.6  >60.0 mL/min/1.73 Final    National Kidney Foundation and American Society of Nephrology (ASN) Task Force recommended calculation  based on the Chronic Kidney Disease Epidemiology Collaboration (CKD-EPI) equation refit without adjustment for race.    Lipase 10/10/2022 19  13 - 60 U/L Final    C-Reactive Protein 10/10/2022 <0.30  0.00 - 0.50 mg/dL Final    Color, UA 10/10/2022 Yellow  Yellow, Straw Final    Appearance, UA 10/10/2022 Cloudy (A)  Clear Final    pH, UA 10/10/2022 7.0  5.0 - 8.0 Final    Specific Gravity, UA 10/10/2022 1.026  1.005 - 1.030 Final    Glucose, UA 10/10/2022 Negative  Negative Final    Ketones, UA 10/10/2022 Negative  Negative Final    Bilirubin, UA 10/10/2022 Negative  Negative Final    Blood, UA 10/10/2022 Negative  Negative Final    Protein, UA 10/10/2022 Negative  Negative Final    Leuk Esterase, UA 10/10/2022 Negative  Negative Final    Nitrite, UA 10/10/2022 Negative  Negative Final    Urobilinogen, UA 10/10/2022 1.0 E.U./dL  0.2 - 1.0 E.U./dL Final    THC, Screen, Urine 10/10/2022 Negative  Negative Final    Phencyclidine (PCP), Urine 10/10/2022 Negative  Negative Final    Cocaine Screen, Urine 10/10/2022 Negative  Negative Final    Methamphetamine, Ur 10/10/2022 Negative  Negative Final    Opiate Screen 10/10/2022 Negative  Negative Final    Amphetamine Screen, Urine 10/10/2022 Negative  Negative Final    Benzodiazepine Screen, Urine 10/10/2022 Negative  Negative Final    Tricyclic Antidepressants Screen 10/10/2022 Negative  Negative Final    Methadone Screen, Urine 10/10/2022 Negative  Negative Final    Barbiturates Screen, Urine 10/10/2022 Negative  Negative Final    Oxycodone Screen, Urine 10/10/2022 Negative  Negative Final    Propoxyphene Screen 10/10/2022 Negative  Negative Final    Buprenorphine, Screen, Urine 10/10/2022 Negative  Negative Final    WBC 10/10/2022 6.93  3.40 - 10.80 10*3/mm3 Final    RBC 10/10/2022 5.33  4.14 - 5.80 10*6/mm3 Final    Hemoglobin 10/10/2022 15.5  13.0 - 17.7 g/dL Final    Hematocrit 10/10/2022 46.4  37.5 - 51.0 % Final    MCV 10/10/2022 87.1  79.0 - 97.0 fL Final    MCH  10/10/2022 29.1  26.6 - 33.0 pg Final    MCHC 10/10/2022 33.4  31.5 - 35.7 g/dL Final    RDW 10/10/2022 12.7  12.3 - 15.4 % Final    RDW-SD 10/10/2022 40.5  37.0 - 54.0 fl Final    MPV 10/10/2022 9.1  6.0 - 12.0 fL Final    Platelets 10/10/2022 289  140 - 450 10*3/mm3 Final    Neutrophil % 10/10/2022 50.0  42.7 - 76.0 % Final    Lymphocyte % 10/10/2022 41.0  19.6 - 45.3 % Final    Monocyte % 10/10/2022 6.3  5.0 - 12.0 % Final    Eosinophil % 10/10/2022 2.0  0.3 - 6.2 % Final    Basophil % 10/10/2022 0.6  0.0 - 1.5 % Final    Immature Grans % 10/10/2022 0.1  0.0 - 0.5 % Final    Neutrophils, Absolute 10/10/2022 3.46  1.70 - 7.00 10*3/mm3 Final    Lymphocytes, Absolute 10/10/2022 2.84  0.70 - 3.10 10*3/mm3 Final    Monocytes, Absolute 10/10/2022 0.44  0.10 - 0.90 10*3/mm3 Final    Eosinophils, Absolute 10/10/2022 0.14  0.00 - 0.40 10*3/mm3 Final    Basophils, Absolute 10/10/2022 0.04  0.00 - 0.20 10*3/mm3 Final    Immature Grans, Absolute 10/10/2022 0.01  0.00 - 0.05 10*3/mm3 Final    nRBC 10/10/2022 0.0  0.0 - 0.2 /100 WBC Final       Assessment & Plan   Problems Addressed this Visit    None  Visit Diagnoses       Current moderate episode of major depressive disorder, unspecified whether recurrent    -  Primary    Relevant Medications    OLANZapine (ZyPREXA) 7.5 MG tablet    Delusional disorder, grandiose type        Relevant Medications    OLANZapine (ZyPREXA) 7.5 MG tablet    Anxiety disorder, unspecified type        Relevant Medications    prazosin (MINIPRESS) 1 MG capsule    OLANZapine (ZyPREXA) 7.5 MG tablet    propranolol (INDERAL) 20 MG tablet    Medication management        History of posttraumatic stress disorder (PTSD)        Relevant Medications    prazosin (MINIPRESS) 1 MG capsule    OLANZapine (ZyPREXA) 7.5 MG tablet    History of autism spectrum disorder        Relevant Medications    OLANZapine (ZyPREXA) 7.5 MG tablet    Current moderate episode of major depressive disorder, unspecified whether  recurrent (HCC)-improved        Relevant Medications    OLANZapine (ZyPREXA) 7.5 MG tablet          Diagnoses         Codes Comments    Current moderate episode of major depressive disorder, unspecified whether recurrent    -  Primary ICD-10-CM: F32.1  ICD-9-CM: 296.22     Delusional disorder, grandiose type     ICD-10-CM: F22  ICD-9-CM: 297.1     Anxiety disorder, unspecified type     ICD-10-CM: F41.9  ICD-9-CM: 300.00     Medication management     ICD-10-CM: Z79.899  ICD-9-CM: V58.69     History of posttraumatic stress disorder (PTSD)     ICD-10-CM: Z86.59  ICD-9-CM: V11.8     History of autism spectrum disorder     ICD-10-CM: F84.0  ICD-9-CM: 299.00     Current moderate episode of major depressive disorder, unspecified whether recurrent (HCC)-improved     ICD-10-CM: F32.1  ICD-9-CM: 296.22           Social History     Tobacco Use   Smoking Status Every Day    Packs/day: 0.25    Types: Cigarettes   Smokeless Tobacco Never     ERICA reviewed and appropriate. Patient counseled on use of controlled substances.     -The benefits of a healthy diet and exercise were discussed with patient, especially the positive effects they have on mental health. Patient encouraged to consider lifestyle modification regarding  diet and exercise patterns to maximize results of mental health treatment.  -Reviewed previous available documentation  -Reviewed most recent available labs   -Lengthy discussion with patient on the possible side effects of antipsychotic medications including increased cholesterol, increased blood sugar, and possibility of weight gain.  Also discussed the need to monitor lab work associated with this.  The risk of muscle movement disorders with this class of medication was also discussed.      Visit Diagnoses:    ICD-10-CM ICD-9-CM   1. Current moderate episode of major depressive disorder, unspecified whether recurrent  F32.1 296.22   2. Delusional disorder, grandiose type  F22 297.1   3. Anxiety disorder,  unspecified type  F41.9 300.00   4. Medication management  Z79.899 V58.69   5. History of posttraumatic stress disorder (PTSD)  Z86.59 V11.8   6. History of autism spectrum disorder  F84.0 299.00   7. Current moderate episode of major depressive disorder, unspecified whether recurrent (HCC)-improved  F32.1 296.22         TREATMENT PLAN/GOALS: Continue supportive psychotherapy efforts and medications as indicated. Treatment and medication options discussed during today's visit. Patient acknowledged and verbally consented to continue with current treatment plan and was educated on the importance of compliance with treatment and follow-up appointments.    MEDICATION ISSUES:  Discussed medication options and treatment plan of prescribed medication as well as the risks, benefits, and side effects including potential falls, possible impaired driving and metabolic adversities among others. Patient is agreeable to call the office with any worsening of symptoms or onset of side effects. Patient is agreeable to call 911 or go to the nearest ER should he/she begin having SI/HI.     MEDS ORDERED DURING VISIT:  New Medications Ordered This Visit   Medications    prazosin (MINIPRESS) 1 MG capsule     Sig: Take 1 capsule by mouth Daily As Needed (flashbacks).     Dispense:  30 capsule     Refill:  2    OLANZapine (ZyPREXA) 7.5 MG tablet     Sig: Take 1 tablet by mouth Every Night.     Dispense:  30 tablet     Refill:  2    propranolol (INDERAL) 20 MG tablet     Sig: Take 0.5-1 tablets by mouth Daily As Needed (anxiety). Avoid using within 6 hrs of prazosin     Dispense:  30 tablet     Refill:  2     Return if symptoms worsen or fail to improve, for Recheck.    -Continue Zyprexa 7.5 mg tablet, take 1 tablet daily for mood   -Continue Prazosin 1 mg tablet at  Night for nightmares (  -Continue Propanolol 20 mg take 0.5-1 tablets daily as needed for anxiety        Prognosis: Guarded dependent on medication/follow up and treatment plan  compliance.  Functionality: pt showing improvements in important areas of daily functioning.     Short-term goals: Patient will adhere to medication regimen and note continued improvement in symptoms over the next 3 months.   Long-term goals: Patient will be adherent to medication management and psychotherapy with continued improvement in symptoms over the next 6 months    I spent 30 minutes caring for Srini on this date of service. This time includes time spent by me in the following activities: preparing for the visit, obtaining and/or reviewing a separately obtained history, performing a medically appropriate examination and/or evaluation, counseling and educating the patient/family/caregiver, ordering medications, tests, or procedures, and documenting information in the medical record          This document has been electronically signed by CONNIE Childers   October 2, 2023 12:28 EDT    Part of this note may be an electronic transcription/translation of spoken language to printed text using the Dragon Dictation System.

## 2023-10-02 ENCOUNTER — OFFICE VISIT (OUTPATIENT)
Dept: PSYCHIATRY | Facility: CLINIC | Age: 23
End: 2023-10-02
Payer: COMMERCIAL

## 2023-10-02 VITALS
HEIGHT: 72 IN | HEART RATE: 92 BPM | WEIGHT: 258 LBS | BODY MASS INDEX: 34.95 KG/M2 | DIASTOLIC BLOOD PRESSURE: 93 MMHG | SYSTOLIC BLOOD PRESSURE: 128 MMHG | OXYGEN SATURATION: 98 %

## 2023-10-02 DIAGNOSIS — Z79.899 MEDICATION MANAGEMENT: ICD-10-CM

## 2023-10-02 DIAGNOSIS — Z86.59 HISTORY OF POSTTRAUMATIC STRESS DISORDER (PTSD): ICD-10-CM

## 2023-10-02 DIAGNOSIS — F32.1 CURRENT MODERATE EPISODE OF MAJOR DEPRESSIVE DISORDER, UNSPECIFIED WHETHER RECURRENT: ICD-10-CM

## 2023-10-02 DIAGNOSIS — F41.9 ANXIETY DISORDER, UNSPECIFIED TYPE: ICD-10-CM

## 2023-10-02 DIAGNOSIS — F22: ICD-10-CM

## 2023-10-02 DIAGNOSIS — F32.1 CURRENT MODERATE EPISODE OF MAJOR DEPRESSIVE DISORDER, UNSPECIFIED WHETHER RECURRENT: Primary | ICD-10-CM

## 2023-10-02 DIAGNOSIS — F84.0 HISTORY OF AUTISM SPECTRUM DISORDER: ICD-10-CM

## 2023-10-02 RX ORDER — OLANZAPINE 7.5 MG/1
7.5 TABLET ORAL NIGHTLY
Qty: 30 TABLET | Refills: 2 | Status: SHIPPED | OUTPATIENT
Start: 2023-10-02

## 2023-10-02 RX ORDER — PRAZOSIN HYDROCHLORIDE 1 MG/1
1 CAPSULE ORAL DAILY PRN
Qty: 30 CAPSULE | Refills: 2 | Status: SHIPPED | OUTPATIENT
Start: 2023-10-02

## 2023-10-02 RX ORDER — PROPRANOLOL HYDROCHLORIDE 20 MG/1
10-20 TABLET ORAL DAILY PRN
Qty: 30 TABLET | Refills: 2 | Status: SHIPPED | OUTPATIENT
Start: 2023-10-02

## 2023-10-02 RX ORDER — NAPROXEN 500 MG/1
TABLET ORAL
COMMUNITY
Start: 2023-09-24

## 2025-02-06 ENCOUNTER — OFFICE VISIT (OUTPATIENT)
Dept: NEUROSURGERY | Facility: CLINIC | Age: 25
End: 2025-02-06
Payer: COMMERCIAL

## 2025-02-06 VITALS — HEIGHT: 72 IN | BODY MASS INDEX: 35.35 KG/M2 | WEIGHT: 261 LBS | TEMPERATURE: 98 F

## 2025-02-06 DIAGNOSIS — M51.370 DEGENERATION OF INTERVERTEBRAL DISC OF LUMBOSACRAL REGION WITH DISCOGENIC BACK PAIN: Primary | ICD-10-CM

## 2025-02-06 DIAGNOSIS — Z72.0 TOBACCO USE: ICD-10-CM

## 2025-02-06 RX ORDER — OLANZAPINE AND SAMIDORPHAN L-MALATE 10; 10 MG/1; MG/1
TABLET, FILM COATED ORAL
COMMUNITY

## 2025-02-06 NOTE — PROGRESS NOTES
"Patient: Srini Ruiz  : 2000    Primary Care Provider: Rodolfo Conklin III, DO    Requesting Provider: As above      Chief Complaint: Back Pain, Leg Pain (Bilateral Leg Numbness/), and Numbness      History of Present Illness: This is a 24 y.o. male who presents with 1-1/2 years of lower back pain.  The patient denies an initiating event, but states he has been dealing with significant pain in the lower back.  He will sometimes get a numbness feeling in his legs, but he denies any radicular pain in the legs.  He denies any significant weakness in the legs or bowel or bladder incontinence.  He has done physical therapy which he does not think has been helpful, but has never had any injections of his back.    PMHX  Allergies:  Allergies   Allergen Reactions    Latex Hives and Swelling    Amoxicillin Rash     Medications    Current Outpatient Medications:     Lybalvi 10-10 MG tablet, TAKE 1 TABLET BY MOUTH EVERY NIGHT FOR SCHIZOPHRENIA, Disp: , Rfl:     prazosin (MINIPRESS) 1 MG capsule, Take 1 capsule by mouth Daily As Needed (flashbacks)., Disp: 30 capsule, Rfl: 2    propranolol (INDERAL) 20 MG tablet, Take 0.5-1 tablets by mouth Daily As Needed (anxiety). Avoid using within 6 hrs of prazosin, Disp: 30 tablet, Rfl: 2  Past Medical History:  Past Medical History:   Diagnosis Date    ADHD (attention deficit hyperactivity disorder)     Congenital heart defect     \"left ventricle - since resolved\" per patient    Depression     PTSD (post-traumatic stress disorder)      Past Surgical History:  Past Surgical History:   Procedure Laterality Date    KNEE ARTHROSCOPY W/ MENISCECTOMY Right 2022    Procedure: KNEE DIAGNOSTIC ARTHROSCOPY;  Surgeon: Lopez Dubose MD;  Location: [facility];  Service: Orthopedics;  Laterality: Right;     Social Hx:  Social History     Tobacco Use    Smoking status: Former     Current packs/day: 0.25     Types: Cigarettes    Smokeless tobacco: Never   Vaping Use    Vaping status: " "Every Day    Substances: Nicotine, Flavoring    Devices: Disposable, Refillable tank   Substance Use Topics    Alcohol use: Not Currently     Comment: \"once in a great while\" per pt    Drug use: Never     Family Hx:  Family History   Problem Relation Age of Onset    Schizophrenia Brother      Review of Systems:        Review of Systems   Constitutional:  Negative for activity change, appetite change, chills, diaphoresis, fatigue, fever and unexpected weight change.   HENT:  Negative for congestion, dental problem, drooling, ear discharge, ear pain, facial swelling, hearing loss, mouth sores, nosebleeds, postnasal drip, rhinorrhea, sinus pressure, sinus pain, sneezing, sore throat, tinnitus, trouble swallowing and voice change.    Eyes:  Negative for photophobia, pain, discharge, redness, itching and visual disturbance.   Respiratory:  Negative for apnea, cough, choking, chest tightness, shortness of breath, wheezing and stridor.    Cardiovascular:  Negative for chest pain, palpitations and leg swelling.   Gastrointestinal:  Negative for abdominal distention, abdominal pain, anal bleeding, blood in stool, constipation, diarrhea, nausea, rectal pain and vomiting.   Endocrine: Negative for cold intolerance, heat intolerance, polydipsia, polyphagia and polyuria.   Genitourinary:  Negative for decreased urine volume, difficulty urinating, dysuria, enuresis, flank pain, frequency, genital sores, hematuria, penile discharge, penile pain, penile swelling, scrotal swelling, testicular pain and urgency.   Musculoskeletal:  Positive for back pain and gait problem. Negative for arthralgias, joint swelling, myalgias, neck pain and neck stiffness.   Skin:  Negative for color change, pallor, rash and wound.   Allergic/Immunologic: Negative for environmental allergies, food allergies and immunocompromised state.   Neurological:  Positive for weakness and numbness. Negative for dizziness, tremors, seizures, syncope, facial " "asymmetry, speech difficulty, light-headedness and headaches.   Hematological:  Negative for adenopathy. Does not bruise/bleed easily.   Psychiatric/Behavioral:  Negative for agitation, behavioral problems, confusion, decreased concentration, dysphoric mood, hallucinations, self-injury, sleep disturbance and suicidal ideas. The patient is not nervous/anxious and is not hyperactive.         Physical Exam:   Temp 98 °F (36.7 °C) (Infrared)   Ht 182.9 cm (72\")   Wt 118 kg (261 lb)   BMI 35.40 kg/m²   Awake, alert and oriented x 3  Speech f/c  Opens eyes spont  Pupils 3 mm rx bilaterally  Extraocular muscles intact bilaterally  Normal sensation to light touch in all 3 distributions of CN V bilaterally  Face symmetric bilaterally  Tongue midline  5/5 in the lower extremities bilaterally    Diagnostic Studies:  All neurological imaging studies were independently reviewed unless stated otherwise    Assessment/Plan:  This is a 24 y.o. male presenting with 1-1/2 years of lower back pain.  In reviewing the patient's lumbar MRI, he has degenerative disc disease at L5-S1 where there is a small disc bulge but there is no significant central or neuroforaminal stenosis.  I do not appreciate any nerve root compression at this level.  Clinically, the patient is not having any significant symptoms of radiculopathy, but his pain is localized to his lower spine.  I explained to the patient that back surgery for back pain would be unlikely to help and given that he is not having any significant leg symptoms, I do not think he would find improvement with surgery.  Additionally, given his young age, we would not want to operate unless we absolutely had to.  The patient understood.  I recommended he follow-up with pain management to discuss injection options as I do think this would be a reasonable next step to try to control his back pain.  The patient understood.  I told the patient to give us a call if he develops any new or " worsening lower extremity symptoms, but at this point, we will not schedule further follow-up.    Diagnoses and all orders for this visit:    1. Degeneration of intervertebral disc of lumbosacral region with discogenic back pain (Primary)      Srini Ruiz  reports that he has quit smoking. His smoking use included cigarettes. He has never used smokeless tobacco.         Oni Breen MD  02/06/25  11:33 EST

## (undated) DEVICE — NDL,TUOHY EPID,17GX3.5",PLASTIC STYLET: Brand: MEDLINE

## (undated) DEVICE — SPNG GZ WOVN 4X4IN 12PLY 10/BX STRL

## (undated) DEVICE — TBG PUMP ARTHSCP MAIN AR6400 16FT

## (undated) DEVICE — WRP COMPR KN COLD UNIV

## (undated) DEVICE — GLV SURG SENSICARE W/ALOE PF LF 8.5 STRL

## (undated) DEVICE — GOWN,NON-REINFORCED,SIRUS,SET IN SLV,XXL: Brand: MEDLINE

## (undated) DEVICE — BLD CUT FORMLA AGGR PLS 4.0MM

## (undated) DEVICE — PAD REPL POST SURG TOTL KN

## (undated) DEVICE — SUT ETHLN 3/0 PS2 18 IN 1669H

## (undated) DEVICE — PAD LEG HLDR

## (undated) DEVICE — GLV SURG TRIUMPH MICRO PF LTX 8 STRL

## (undated) DEVICE — 4-PORT MANIFOLD: Brand: NEPTUNE 2

## (undated) DEVICE — SYR LUERLOK 30CC

## (undated) DEVICE — MAT FLR ABSORBENT LG 4FT 10 2.5FT

## (undated) DEVICE — APPL CHLORAPREP HI/LITE 26ML ORNG

## (undated) DEVICE — BNDG ELAS ELITE V/CLOSE 6IN 5YD LF STRL

## (undated) DEVICE — PK KN ARTHSCP 70

## (undated) DEVICE — HOLDER: Brand: DEROYAL

## (undated) DEVICE — DRSNG WND GZ CURAD OIL EMULSION 3X8IN LF STRL 1PK